# Patient Record
Sex: FEMALE | Race: WHITE | Employment: OTHER | URBAN - METROPOLITAN AREA
[De-identification: names, ages, dates, MRNs, and addresses within clinical notes are randomized per-mention and may not be internally consistent; named-entity substitution may affect disease eponyms.]

---

## 2018-07-24 ENCOUNTER — OFFICE VISIT (OUTPATIENT)
Dept: FAMILY PLANNING/WOMEN'S HEALTH CLINIC | Age: 63
End: 2018-07-24

## 2018-07-24 ENCOUNTER — HOSPITAL ENCOUNTER (OUTPATIENT)
Dept: LAB | Age: 63
Discharge: HOME | End: 2018-07-24

## 2018-07-24 ENCOUNTER — HOSPITAL ENCOUNTER (OUTPATIENT)
Dept: MAMMOGRAPHY | Age: 63
Discharge: HOME | End: 2018-07-24

## 2018-07-24 VITALS — DIASTOLIC BLOOD PRESSURE: 66 MMHG | SYSTOLIC BLOOD PRESSURE: 111 MMHG

## 2018-07-24 DIAGNOSIS — N63.10 BREAST MASS, RIGHT: ICD-10-CM

## 2018-07-24 DIAGNOSIS — Z12.31 VISIT FOR SCREENING MAMMOGRAM: ICD-10-CM

## 2018-07-24 DIAGNOSIS — Z01.419 WELL WOMAN EXAM WITH ROUTINE GYNECOLOGICAL EXAM: Primary | ICD-10-CM

## 2018-07-24 PROCEDURE — 88175 CYTOPATH C/V AUTO FLUID REDO: CPT | Performed by: PHYSICIAN ASSISTANT

## 2018-07-24 NOTE — PROGRESS NOTES
Assessment/Plan:    Diagnoses and all orders for this visit:    1. Well woman exam with routine gynecological exam  -     PAP IG, RFX APTIMA HPV ASCUS ([id number]))    2. Breast mass, right  Mass has been present since 10/17. She has never had a mammogram before. The characteristics of the mass are concerning for cancer. Get diagnostic mammogram       Follow-up Disposition: Not on File    [address] [facility], [facility]  [address] [facility]. expressed understanding of this plan. An AVS was printed and given to the patient.      ----------------------------------------------------------------------    Chief Complaint   Patient presents with    Well Woman     [facility] visit       History of Present Illness:    Menopause 13 years ago  Not sexually active  Never has had a mammogram before  Felt a right breast mass 10/17. Since then, she has not noted any change in size or characteristics. It is painful at times and she puts ice on it. She states that when she palpates it it sometimes gets bigger for a short time then reduces its size again. She does not have a hx of fibrocystic breasts that she is aware of. Last pap? Where? She is not sure of the clinic name  She had a colonoscopy 20 years ago. Her stool test was negative for blood today  She does not have regular health care- I talked to her about [facility] and [facility] as options for uninsured pts        No past medical history on file. No Known Allergies    Social History   Substance Use Topics    Smoking status: Former Smoker    Smokeless tobacco: Never Used    Alcohol use Not on file       No family history on file. Physical Exam:     Visit Vitals    /66       A&Ox3  WDWN NAD  Respirations normal and non labored  Breast exam- right breast has a 1 by 1.5-2 cm irregular mass at 6 oclock about 1 inch below nipple. The mass feels to have a sharp superior edge and then wider base.  It did not change in shape or size during the exam. There is no retraction or skin color changes or other masses in her left breast   Pelvic exam ext neg for lesion or discharge. Vagina and cervix show mild post menopausal changes. Pap done.  Uterus and adnexal exam neg for mass or tenderness  Rectal- stool neg for heme
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
## 2018-07-31 ENCOUNTER — APPOINTMENT (OUTPATIENT)
Dept: MAMMOGRAPHY | Age: 63
End: 2018-07-31

## 2018-07-31 ENCOUNTER — HOSPITAL ENCOUNTER (OUTPATIENT)
Dept: MAMMOGRAPHY | Age: 63
Discharge: HOME | End: 2018-07-31

## 2018-07-31 DIAGNOSIS — R92.8 ABNORMAL SCREENING MAMMOGRAM: ICD-10-CM

## 2018-07-31 DIAGNOSIS — N63.10 BREAST MASS, RIGHT: ICD-10-CM

## 2018-07-31 PROCEDURE — 77066 DX MAMMO INCL CAD BI: CPT

## 2018-07-31 PROCEDURE — 76642 ULTRASOUND BREAST LIMITED: CPT

## 2018-07-31 NOTE — PROGRESS NOTES
Results of mammogram were called to [facility]. I spoke with Yuki Mckeon. They said they will contact patient to set up appointment with [facility].

## 2018-08-09 ENCOUNTER — HOSPITAL ENCOUNTER (OUTPATIENT)
Dept: LAB | Age: 63
Discharge: HOME | End: 2018-08-09

## 2018-08-09 ENCOUNTER — DOCUMENTATION ONLY (OUTPATIENT)
Dept: SURGERY | Age: 63
End: 2018-08-09

## 2018-08-09 ENCOUNTER — OFFICE VISIT (OUTPATIENT)
Dept: SURGERY | Age: 63
End: 2018-08-09

## 2018-08-09 VITALS
WEIGHT: 134 LBS | DIASTOLIC BLOOD PRESSURE: 76 MMHG | SYSTOLIC BLOOD PRESSURE: 147 MMHG | HEART RATE: 76 BPM | HEIGHT: 66 IN | BODY MASS INDEX: 21.53 KG/M2

## 2018-08-09 DIAGNOSIS — R92.8 ABNORMAL ULTRASOUND OF BREAST: Primary | ICD-10-CM

## 2018-08-09 DIAGNOSIS — R92.8 ABNORMAL MAMMOGRAM: ICD-10-CM

## 2018-08-09 NOTE — MR AVS SNAPSHOT
[address] [place] [address] [address] [facility] 
[phone] Patient: Maria Victoria Nicholas MRN: [id number] Visit Information Date & Time Provider Department Dept. Phone Encounter #  
 8/9/2018  9:30 AM Debo Reeder MD [facility] [phone] [id number] Upcoming Health Maintenance Date Due Hepatitis C Screening 1955 DTaP/Tdap/Td series (1 - Tdap) 1/17/1976 FOBT Q 1 YEAR AGE 50-75 1/17/2005 ZOSTER VACCINE AGE 60> 11/17/2014 Influenza Age 5 to Adult 8/1/2018 BREAST CANCER SCRN MAMMOGRAM 7/31/2020 PAP AKA CERVICAL CYTOLOGY 7/24/2021 Allergies as of 8/9/2018  Review Complete On: 8/9/2018 By: Debo Reeder MD  
 No Known Allergies Current Immunizations  Never Reviewed No immunizations on file. Not reviewed this visit You Were Diagnosed With   
  
 Codes Comments Abnormal ultrasound of breast    -  Primary ICD-10-CM: R92.8 ICD-9-CM: 793.89 Abnormal mammogram     ICD-10-CM: R92.8 ICD-9-CM: 793.80 Vitals BP Pulse Height(growth percentile) Weight(growth percentile) BMI OB Status 147/76 76 5' 6\" (1.676 m) 134 lb (60.8 kg) 21.63 kg/m2 Postmenopausal  
 Smoking Status Former Smoker BMI and BSA Data Body Mass Index Body Surface Area  
 21.63 kg/m 2 1.68 m 2 Your Updated Medication List  
  
Notice  As of 8/9/2018  4:50 PM  
 You have not been prescribed any medications. We Performed the Following SURGICAL PATHOLOGY [PHL8167 Custom] Patient Instructions Needle Breast Biopsy: About This Test 
What is it? A breast biopsy removes a sample of breast tissue that is looked at under a microscope to check for breast cancer. For a needle breast biopsy, your doctor uses a needle to take a small sample of fluid or cells from the breast for testing. Why is this test done? A breast biopsy is usually done to check a lump found during a breast exam or a suspicious area found on a mammogram or other imaging. If there is a good chance that your doctor can get a sample without doing an open (surgical) biopsy, you can have a needle biopsy. You may have a choice of what kind of biopsy you prefer. How can you prepare for the test? 
Talk to your doctor about all your health conditions before the test. For example, tell your doctor if you: · Are taking any medicines. · Are allergic to any medicines. · Have had bleeding problems, or if you take aspirin or some other blood thinner. · Are or might be pregnant. What happens before the test? 
· You will take off your clothing above the waist. A paper or cloth gown will cover your shoulders. · You will sit or lie on an examination table. Your hands may be at your sides or raised above your head (whichever position makes it easiest to find the lump or suspicious area). · Your skin is washed with a special soap. · You may get a shot of medicine to numb the biopsy area on your breast. 
What happens during the test? 
· For a fine-needle aspiration biopsy, your doctor inserts a thin needle into the lump or suspicious area. Then he or she removes a sample of cells or fluid. · For a core needle biopsy: ¨ A small cut is made in your skin. ¨ Your doctor inserts a needle with a special tip. Then he or she removes a sample of breast tissue about the size of a grain of rice. ¨ About 3 to 12 samples are needed to get the most accurate results. After either type of biopsy, the needle is removed and pressure is put on the needle site to stop any bleeding. The area is covered with a bandage. What else should you know about the test? 
· You will feel only a quick sting from the needle if you have a local anesthetic to numb the biopsy area. You may feel some pressure when the biopsy needle is put in. · For a core needle biopsy, the small cut for the needle does not usually need stitches. How long does the test take? · A fine-needle aspiration biopsy will take about 5 to 15 minutes. · A core needle biopsy will take about 15 minutes. What happens after the test? 
· You'll be told how long it may take to get your results back. · You will probably be able to go home right away. · You can go back to your usual activities right away, but avoid heavy lifting for 24 hours. · The site may be tender for 2 to 3 days. You may also have some bruising, swelling, or slight bleeding. ¨ You can use an ice pack. Put ice or a cold pack on the area for 10 to 20 minutes at a time. Put a thin cloth between the ice and your skin. ¨ Ask your doctor if you can take an over-the-counter pain medicine, such as acetaminophen (Tylenol), ibuprofen (Advil, Motrin), or naproxen (Aleve). Be safe with medicines. Read and follow all instructions on the label. · After a specialist looks at the biopsy sample for signs of cancer, your doctor's office will let you know the results. · If the test results are not clear, you may have another biopsy or test. 
Follow-up care is a key part of your treatment and safety. Be sure to make and go to all appointments, and call your doctor if you are having problems. It's also a good idea to keep a list of the medicines you take. Ask your doctor when you can expect to have your test results. Where can you learn more? Go to http://maki-andreia.info/. Enter R775 in the search box to learn more about \"Needle Breast Biopsy: About This Test.\" Current as of: May 12, 2017 Content Version: 11.7 © 2931-7547 [employer]. Care instructions adapted under license by [employer] (which disclaims liability or warranty for this information).  If you have questions about a medical condition or this instruction, always ask your healthcare professional. [employer] disclaims any warranty or liability for your use of this information. Needle Breast Biopsy: About This Test 
What is it? A breast biopsy removes a sample of breast tissue that is looked at under a microscope to check for breast cancer. For a needle breast biopsy, your doctor uses a needle to take a small sample of fluid or cells from the breast for testing. Why is this test done? A breast biopsy is usually done to check a lump found during a breast exam or a suspicious area found on a mammogram or other imaging. If there is a good chance that your doctor can get a sample without doing an open (surgical) biopsy, you can have a needle biopsy. You may have a choice of what kind of biopsy you prefer. How can you prepare for the test? 
Talk to your doctor about all your health conditions before the test. For example, tell your doctor if you: · Are taking any medicines. · Are allergic to any medicines. · Have had bleeding problems, or if you take aspirin or some other blood thinner. · Are or might be pregnant. What happens before the test? 
· You will take off your clothing above the waist. A paper or cloth gown will cover your shoulders. · You will sit or lie on an examination table. Your hands may be at your sides or raised above your head (whichever position makes it easiest to find the lump or suspicious area). · Your skin is washed with a special soap. · You may get a shot of medicine to numb the biopsy area on your breast. 
What happens during the test? 
· For a fine-needle aspiration biopsy, your doctor inserts a thin needle into the lump or suspicious area. Then he or she removes a sample of cells or fluid. · For a core needle biopsy: ¨ A small cut is made in your skin. ¨ Your doctor inserts a needle with a special tip. Then he or she removes a sample of breast tissue about the size of a grain of rice. ¨ About 3 to 12 samples are needed to get the most accurate results. After either type of biopsy, the needle is removed and pressure is put on the needle site to stop any bleeding. The area is covered with a bandage. What else should you know about the test? 
· You will feel only a quick sting from the needle if you have a local anesthetic to numb the biopsy area. You may feel some pressure when the biopsy needle is put in. · For a core needle biopsy, the small cut for the needle does not usually need stitches. How long does the test take? · A fine-needle aspiration biopsy will take about 5 to 15 minutes. · A core needle biopsy will take about 15 minutes. What happens after the test? 
· You'll be told how long it may take to get your results back. · You will probably be able to go home right away. · You can go back to your usual activities right away, but avoid heavy lifting for 24 hours. · The site may be tender for 2 to 3 days. You may also have some bruising, swelling, or slight bleeding. ¨ You can use an ice pack. Put ice or a cold pack on the area for 10 to 20 minutes at a time. Put a thin cloth between the ice and your skin. ¨ Ask your doctor if you can take an over-the-counter pain medicine, such as acetaminophen (Tylenol), ibuprofen (Advil, Motrin), or naproxen (Aleve). Be safe with medicines. Read and follow all instructions on the label. · After a specialist looks at the biopsy sample for signs of cancer, your doctor's office will let you know the results. · If the test results are not clear, you may have another biopsy or test. 
Follow-up care is a key part of your treatment and safety. Be sure to make and go to all appointments, and call your doctor if you are having problems. It's also a good idea to keep a list of the medicines you take. Ask your doctor when you can expect to have your test results. Where can you learn more? Go to http://maki-andreia.info/.  
Enter J535 in the search box to learn more about \"Needle Breast Biopsy: About This Test.\" Current as of: May 12, 2017 Content Version: 11.7 © 7994-3643 [employer]. Care instructions adapted under license by [employer] (which disclaims liability or warranty for this information). If you have questions about a medical condition or this instruction, always ask your healthcare professional. [address]  any warranty or liability for your use of this information. Introducing [facility]! [employer] introduces Sittercity patient portal. Now you can access parts of your medical record, email your doctor's office, and request medication refills online. 1. In your internet browser, go to [email or web address]. [employer] 2. Click on the First Time User? Click Here link in the Sign In box. You will see the New Member Sign Up page. 3. Enter your Sittercity Access Code exactly as it appears below. You will not need to use this code after youve completed the sign-up process. If you do not sign up before the expiration date, you must request a new code. · Sittercity Access Code: [email or web address] Expires: 10/18/2018 11:28 AM 
 
4. Enter the last four digits of your Social Security Number (xxxx) and Date of Birth (mm/dd/yyyy) as indicated and click Submit. You will be taken to the next sign-up page. 5. Create a Sittercity ID. This will be your Sittercity login ID and cannot be changed, so think of one that is secure and easy to remember. 6. Create a Sittercity password. You can change your password at any time. 7. Enter your Password Reset Question and Answer. This can be used at a later time if you forget your password. 8. Enter your e-mail address. You will receive e-mail notification when new information is available in [address] 9. Click Sign Up. You can now view and download portions of your medical record. 10. Click the Download Summary menu link to download a portable copy of your medical information. If you have questions, please visit the Frequently Asked Questions section of the [employer] website. Remember, [employer] is NOT to be used for urgent needs. For medical emergencies, dial 911. Now available from your iPhone and Android! Please provide this summary of care documentation to your next provider. Your primary care clinician is listed as NONE. If you have any questions after today's visit, please call [phone].

## 2018-08-09 NOTE — PROGRESS NOTES
HISTORY OF PRESENT ILLNESS  Cecy Chiu is a 61 y.o. female. HPI  NEW patient consult referred by Imaging for abnormal RIGHT breast mammogram.  Patient felt a small bead-like lump to there RIGHT breast in 2017. The RIGHT breast hurts when she uses her seat belt or presses her breast.  Denies skin changes or nipple discharge/retraction. Obstetric History       T0      L0     SAB0   TAB0   Ectopic0   Molar0   Multiple0   Live Births0    Obstetric Comments   Menarche 15, LMP 48, # of children 2, age of 4st delivery 12, Hysterectomy/oophorectomy no/no, Breast bx no, history of breast feeding no, BCP yes, Hormone therapy no     FH:  Maternal aunt had breast cancer at 80, survivor. Mammogram, 18, BIRADS 4C  No previous mammograms. FINDINGS:   Bilateral digital diagnostic mammography was performed and interpreted in  conjunction with CAD over-read. At the 7:00 position of the right breast, there  are segmental pleomorphic microcalcifications measuring approximately 2.6 cm in  extent. No definite associated mass is evident. There are no suspicious findings  in the left breast.     Limited ultrasound examination of the right breast was performed by the  technologist and myself. At the 7 to 8:00 position, at the site of the palpable  lump, there are dilated ducts with intraductal filling defects. The filling  defects demonstrate internal vascularity on color Doppler imaging. No localized  solid mass is evident. IMPRESSION:  BI-RADS Assessment Category 4C: Suspicious abnormality - Biopsy should be  performed in the absence of clinical contraindication. Palpable finding in the  right breast corresponds to segmental pleomorphic microcalcifications on  mammography and abnormal ducts with intraductal filling defects on ultrasound. Findings are worrisome for ductal carcinoma in situ. Further evaluation with  core needle biopsy is recommended.  Biopsy could be performed with stereotactic  or ultrasound guidance. Findings and recommendations were discussed with the  patient. Review of Systems   HENT: Negative. Eyes: Positive for blurred vision. Respiratory: Negative. Cardiovascular: Positive for chest pain. Gastrointestinal: Negative. Genitourinary: Negative. Musculoskeletal: Positive for back pain, joint pain and myalgias. Skin: Negative. Neurological: Positive for weakness. Endo/Heme/Allergies: Negative. Psychiatric/Behavioral: Negative. Physical Exam   Constitutional: She is oriented to person, place, and time. She appears well-developed and well-nourished. Cardiovascular: Normal rate, regular rhythm and normal heart sounds. Pulmonary/Chest: Effort normal and breath sounds normal. Right breast exhibits mass (2 cm hard fixed nodule just under the skin. no skin changes) and tenderness (tender with palpation). Right breast exhibits no inverted nipple, no nipple discharge and no skin change. Left breast exhibits no inverted nipple, no mass, no nipple discharge, no skin change and no tenderness. Breasts are symmetrical.       Lymphadenopathy:     She has no cervical adenopathy. She has no axillary adenopathy. Right: No supraclavicular adenopathy present. Left: No supraclavicular adenopathy present. Neurological: She is alert and oriented to person, place, and time. Skin: Skin is warm and dry. US - Guided Core Biopsy  Indication : RIGHT Breast mass lower outer quadrant. palpable. Ultrasound Findings: RIGHT 7:00 2/3 1 cm irregular hypoechoic mass with shadowing  Prep : alcohol. Anesthesia : 1% lidocaine with epinephrine, 7cc. Device : The hand-held 10 gauge BARD needle was inserted through the lesion and captured tissue with real-time Ultrasound Confirmation. .   Core Sampling :  3 cores were obtained. Marker: clip placed   Dressing : Steristrips, gauze and tape. Instructions : Remove gauze this evening.   Remove steristrips in one week. 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 This is a single focus measuring less than one millimeter.  Recommend  performing ER receptors on subsequent excision specimen. Concordance:  ASSESSMENT and PLAN    ICD-10-CM ICD-9-CM    1. Abnormal ultrasound of breast R92.8 793.89 SURGICAL PATHOLOGY   2. Abnormal mammogram R92.8 793.80 SURGICAL PATHOLOGY     RIGHT breast mass biopsied. The mass is palpable   Mammogram shows a 2.6 cm cluster of microcalcifications. Ultrasound shows a 1 cm mass with shadowing    We discussed lumpectomy if the biopsy is postiive    If positive MRI should be performed as patient has very dense breast tissue. Biopsy is positive, although only a tiny focus of DCIS was identified.   Will order breast MRI

## 2018-08-09 NOTE — PROGRESS NOTES
[facility]  OFFICE PROCEDURE PROGRESS NOTE        Chart reviewed for the following:   IDr. Ryan, have reviewed the History, Physical and updated the Allergic reactions for [place] performed immediately prior to start of procedure:   IDr. Ryan, have performed the following reviews on [address] [place] [address] prior to the start of the procedure:            * Patient was identified by name and date of birth   * Agreement on procedure being performed was verified  * Risks and Benefits explained to the patient  * Procedure site verified and marked as necessary  * Patient was positioned for comfort  * Consent was signed and verified     Time: 10:00 am      Date of procedure: 8/9/2018    Procedure performed by:  Ryan Mccarty MD    Provider assisted by: Sin Baxter RN    Patient assisted by: Sin Baxter RN    How tolerated by patient: Patient tolerated the procedure well without complications. Denies pain post biopsy. Post Procedural Pain Scale: 0/10    Comments: Written and verbal post biopsy instructions reviewed with and given to patient with her understanding.

## 2018-08-09 NOTE — PATIENT INSTRUCTIONS
Needle Breast Biopsy: About This Test  What is it? A breast biopsy removes a sample of breast tissue that is looked at under a microscope to check for breast cancer. For a needle breast biopsy, your doctor uses a needle to take a small sample of fluid or cells from the breast for testing. Why is this test done? A breast biopsy is usually done to check a lump found during a breast exam or a suspicious area found on a mammogram or other imaging. If there is a good chance that your doctor can get a sample without doing an open (surgical) biopsy, you can have a needle biopsy. You may have a choice of what kind of biopsy you prefer. How can you prepare for the test?  Talk to your doctor about all your health conditions before the test. For example, tell your doctor if you:  · Are taking any medicines. · Are allergic to any medicines. · Have had bleeding problems, or if you take aspirin or some other blood thinner. · Are or might be pregnant. What happens before the test?  · You will take off your clothing above the waist. A paper or cloth gown will cover your shoulders. · You will sit or lie on an examination table. Your hands may be at your sides or raised above your head (whichever position makes it easiest to find the lump or suspicious area). · Your skin is washed with a special soap. · You may get a shot of medicine to numb the biopsy area on your breast.  What happens during the test?  · For a fine-needle aspiration biopsy, your doctor inserts a thin needle into the lump or suspicious area. Then he or she removes a sample of cells or fluid. · For a core needle biopsy:  ¨ A small cut is made in your skin. ¨ Your doctor inserts a needle with a special tip. Then he or she removes a sample of breast tissue about the size of a grain of rice. ¨ About 3 to 12 samples are needed to get the most accurate results.   After either type of biopsy, the needle is removed and pressure is put on the needle site to stop any bleeding. The area is covered with a bandage. What else should you know about the test?  · You will feel only a quick sting from the needle if you have a local anesthetic to numb the biopsy area. You may feel some pressure when the biopsy needle is put in. · For a core needle biopsy, the small cut for the needle does not usually need stitches. How long does the test take? · A fine-needle aspiration biopsy will take about 5 to 15 minutes. · A core needle biopsy will take about 15 minutes. What happens after the test?  · You'll be told how long it may take to get your results back. · You will probably be able to go home right away. · You can go back to your usual activities right away, but avoid heavy lifting for 24 hours. · The site may be tender for 2 to 3 days. You may also have some bruising, swelling, or slight bleeding. ¨ You can use an ice pack. Put ice or a cold pack on the area for 10 to 20 minutes at a time. Put a thin cloth between the ice and your skin. ¨ Ask your doctor if you can take an over-the-counter pain medicine, such as acetaminophen (Tylenol), ibuprofen (Advil, Motrin), or naproxen (Aleve). Be safe with medicines. Read and follow all instructions on the label. · After a specialist looks at the biopsy sample for signs of cancer, your doctor's office will let you know the results. · If the test results are not clear, you may have another biopsy or test.  Follow-up care is a key part of your treatment and safety. Be sure to make and go to all appointments, and call your doctor if you are having problems. It's also a good idea to keep a list of the medicines you take. Ask your doctor when you can expect to have your test results. Where can you learn more? Go to http://maki-andreia.info/. Enter U949 in the search box to learn more about \"Needle Breast Biopsy: About This Test.\"  Current as of:  May 12, 2017  Content Version: 11.7  © 5860-1015 Healthwise, Incorporated. Care instructions adapted under license by [employer] (which disclaims liability or warranty for this information). If you have questions about a medical condition or this instruction, always ask your healthcare professional. [address] any warranty or liability for your use of this information. Needle Breast Biopsy: About This Test  What is it? A breast biopsy removes a sample of breast tissue that is looked at under a microscope to check for breast cancer. For a needle breast biopsy, your doctor uses a needle to take a small sample of fluid or cells from the breast for testing. Why is this test done? A breast biopsy is usually done to check a lump found during a breast exam or a suspicious area found on a mammogram or other imaging. If there is a good chance that your doctor can get a sample without doing an open (surgical) biopsy, you can have a needle biopsy. You may have a choice of what kind of biopsy you prefer. How can you prepare for the test?  Talk to your doctor about all your health conditions before the test. For example, tell your doctor if you:  · Are taking any medicines. · Are allergic to any medicines. · Have had bleeding problems, or if you take aspirin or some other blood thinner. · Are or might be pregnant. What happens before the test?  · You will take off your clothing above the waist. A paper or cloth gown will cover your shoulders. · You will sit or lie on an examination table. Your hands may be at your sides or raised above your head (whichever position makes it easiest to find the lump or suspicious area). · Your skin is washed with a special soap. · You may get a shot of medicine to numb the biopsy area on your breast.  What happens during the test?  · For a fine-needle aspiration biopsy, your doctor inserts a thin needle into the lump or suspicious area. Then he or she removes a sample of cells or fluid.   · For a core needle biopsy:  ¨ A small cut is made in your skin. ¨ Your doctor inserts a needle with a special tip. Then he or she removes a sample of breast tissue about the size of a grain of rice. ¨ About 3 to 12 samples are needed to get the most accurate results. After either type of biopsy, the needle is removed and pressure is put on the needle site to stop any bleeding. The area is covered with a bandage. What else should you know about the test?  · You will feel only a quick sting from the needle if you have a local anesthetic to numb the biopsy area. You may feel some pressure when the biopsy needle is put in. · For a core needle biopsy, the small cut for the needle does not usually need stitches. How long does the test take? · A fine-needle aspiration biopsy will take about 5 to 15 minutes. · A core needle biopsy will take about 15 minutes. What happens after the test?  · You'll be told how long it may take to get your results back. · You will probably be able to go home right away. · You can go back to your usual activities right away, but avoid heavy lifting for 24 hours. · The site may be tender for 2 to 3 days. You may also have some bruising, swelling, or slight bleeding. ¨ You can use an ice pack. Put ice or a cold pack on the area for 10 to 20 minutes at a time. Put a thin cloth between the ice and your skin. ¨ Ask your doctor if you can take an over-the-counter pain medicine, such as acetaminophen (Tylenol), ibuprofen (Advil, Motrin), or naproxen (Aleve). Be safe with medicines. Read and follow all instructions on the label. · After a specialist looks at the biopsy sample for signs of cancer, your doctor's office will let you know the results. · If the test results are not clear, you may have another biopsy or test.  Follow-up care is a key part of your treatment and safety. Be sure to make and go to all appointments, and call your doctor if you are having problems.  It's also a good idea to keep a list of the medicines you take. Ask your doctor when you can expect to have your test results. Where can you learn more? Go to http://maki-andreia.info/. Enter I404 in the search box to learn more about \"Needle Breast Biopsy: About This Test.\"  Current as of: May 12, 2017  Content Version: 11.7  © 8788-4615 [employer]. Care instructions adapted under license by [employer] (which disclaims liability or warranty for this information). If you have questions about a medical condition or this instruction, always ask your healthcare professional. [place] [address] any warranty or liability for your use of this information.

## 2018-08-14 ENCOUNTER — TELEPHONE (OUTPATIENT)
Dept: SURGERY | Age: 63
End: 2018-08-14

## 2018-08-14 DIAGNOSIS — D05.11 DUCTAL CARCINOMA IN SITU (DCIS) OF RIGHT BREAST: Primary | ICD-10-CM

## 2018-08-14 NOTE — COMMUNICATION BODY
HISTORY OF PRESENT ILLNESS  Marquita Schilder is a 61 y.o. female. HPI  NEW patient consult referred by Imaging for abnormal RIGHT breast mammogram.  Patient felt a small bead-like lump to there RIGHT breast in 2017. The RIGHT breast hurts when she uses her seat belt or presses her breast.  Denies skin changes or nipple discharge/retraction. Obstetric History       T0      L0     SAB0   TAB0   Ectopic0   Molar0   Multiple0   Live Births0    Obstetric Comments   Menarche 15, LMP 48, # of children 2, age of 4st delivery 12, Hysterectomy/oophorectomy no/no, Breast bx no, history of breast feeding no, BCP yes, Hormone therapy no     FH:  Maternal aunt had breast cancer at 80, survivor. Mammogram, 18, BIRADS 4C  No previous mammograms. FINDINGS:   Bilateral digital diagnostic mammography was performed and interpreted in  conjunction with CAD over-read. At the 7:00 position of the right breast, there  are segmental pleomorphic microcalcifications measuring approximately 2.6 cm in  extent. No definite associated mass is evident. There are no suspicious findings  in the left breast.     Limited ultrasound examination of the right breast was performed by the  technologist and myself. At the 7 to 8:00 position, at the site of the palpable  lump, there are dilated ducts with intraductal filling defects. The filling  defects demonstrate internal vascularity on color Doppler imaging. No localized  solid mass is evident. IMPRESSION:  BI-RADS Assessment Category 4C: Suspicious abnormality - Biopsy should be  performed in the absence of clinical contraindication. Palpable finding in the  right breast corresponds to segmental pleomorphic microcalcifications on  mammography and abnormal ducts with intraductal filling defects on ultrasound. Findings are worrisome for ductal carcinoma in situ. Further evaluation with  core needle biopsy is recommended.  Biopsy could be performed with stereotactic  or ultrasound guidance. Findings and recommendations were discussed with the  patient. Review of Systems   HENT: Negative. Eyes: Positive for blurred vision. Respiratory: Negative. Cardiovascular: Positive for chest pain. Gastrointestinal: Negative. Genitourinary: Negative. Musculoskeletal: Positive for back pain, joint pain and myalgias. Skin: Negative. Neurological: Positive for weakness. Endo/Heme/Allergies: Negative. Psychiatric/Behavioral: Negative. Physical Exam   Constitutional: She is oriented to person, place, and time. She appears well-developed and well-nourished. Cardiovascular: Normal rate, regular rhythm and normal heart sounds. Pulmonary/Chest: Effort normal and breath sounds normal. Right breast exhibits mass (2 cm hard fixed nodule just under the skin. no skin changes) and tenderness (tender with palpation). Right breast exhibits no inverted nipple, no nipple discharge and no skin change. Left breast exhibits no inverted nipple, no mass, no nipple discharge, no skin change and no tenderness. Breasts are symmetrical.       Lymphadenopathy:     She has no cervical adenopathy. She has no axillary adenopathy. Right: No supraclavicular adenopathy present. Left: No supraclavicular adenopathy present. Neurological: She is alert and oriented to person, place, and time. Skin: Skin is warm and dry. US - Guided Core Biopsy  Indication : RIGHT Breast mass lower outer quadrant. palpable. Ultrasound Findings: RIGHT 7:00 2/3 1 cm irregular hypoechoic mass with shadowing  Prep : alcohol. Anesthesia : 1% lidocaine with epinephrine, 7cc. Device : The hand-held 10 gauge BARD needle was inserted through the lesion and captured tissue with real-time Ultrasound Confirmation. .   Core Sampling :  3 cores were obtained. Marker: clip placed   Dressing : Steristrips, gauze and tape. Instructions : Remove gauze this evening.   Remove steristrips in one week. 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 This is a single focus measuring less than one millimeter.  Recommend  performing ER receptors on subsequent excision specimen. Concordance:  ASSESSMENT and PLAN    ICD-10-CM ICD-9-CM    1. Abnormal ultrasound of breast R92.8 793.89 SURGICAL PATHOLOGY   2. Abnormal mammogram R92.8 793.80 SURGICAL PATHOLOGY     RIGHT breast mass biopsied. The mass is palpable   Mammogram shows a 2.6 cm cluster of microcalcifications. Ultrasound shows a 1 cm mass with shadowing    We discussed lumpectomy if the biopsy is postiive    If positive MRI should be performed as patient has very dense breast tissue. Biopsy is positive, although only a tiny focus of DCIS was identified.   Will order breast MRI

## 2018-08-15 ENCOUNTER — TELEPHONE (OUTPATIENT)
Dept: FAMILY PLANNING/WOMEN'S HEALTH CLINIC | Age: 63
End: 2018-08-15

## 2018-08-30 ENCOUNTER — HOSPITAL ENCOUNTER (OUTPATIENT)
Dept: MRI IMAGING | Age: 63
Discharge: HOME | End: 2018-08-30
Attending: SURGERY
Payer: MEDICAID

## 2018-08-30 ENCOUNTER — DOCUMENTATION ONLY (OUTPATIENT)
Dept: FAMILY PLANNING/WOMEN'S HEALTH CLINIC | Age: 63
End: 2018-08-30

## 2018-08-30 ENCOUNTER — TELEPHONE (OUTPATIENT)
Dept: SURGERY | Age: 63
End: 2018-08-30

## 2018-08-30 DIAGNOSIS — D05.11 DUCTAL CARCINOMA IN SITU (DCIS) OF RIGHT BREAST: Primary | ICD-10-CM

## 2018-08-30 DIAGNOSIS — D05.11 DUCTAL CARCINOMA IN SITU (DCIS) OF RIGHT BREAST: ICD-10-CM

## 2018-08-30 PROCEDURE — 77059 MRI BREAST BI W WO CONT: CPT

## 2018-08-30 PROCEDURE — A9585 GADOBUTROL INJECTION: HCPCS | Performed by: SURGERY

## 2018-08-30 PROCEDURE — 74011250636 HC RX REV CODE- 250/636: Performed by: SURGERY

## 2018-08-30 RX ADMIN — GADOBUTROL 10 ML: 604.72 INJECTION INTRAVENOUS at 11:03

## 2018-08-30 NOTE — TELEPHONE ENCOUNTER
IMPRESSION:   1. 5.8 x 2.9 x 5.0 cm non mass enhancement in the lower outer quadrant of the  right breast suggest more extensive disease than the mammogram or ultrasound. Biopsy clip is at its anterior, lateral margin. 2. No lymphadenopathy. 3. No MRI evidence of malignancy in the left breast  Recommendation: If patient desires lumpectomy, consider MRI guided biopsy of the  anterior and posterior margins of the non mass enhancement to determine accurate  extent of disease. Spoke with patient  I explained she will need an MRI guided biopsy for marking clips if she would like to have a lumpectomy. Will schedule MRI guided biopsy. Pt anticipates having a lumpectomy at the end of September when she is in between jobs. She is a nanny.

## 2018-09-12 ENCOUNTER — DOCUMENTATION ONLY (OUTPATIENT)
Dept: SURGERY | Age: 63
End: 2018-09-12

## 2018-09-12 NOTE — PROGRESS NOTES
Received a call from Patience Erickson from [facility] imaging and she informed me that this patient's MRI guided biopsy for tomorrow will need to be postponed until next Friday due to machine issues. The patient was notified and offered the new date but could not accept it due to work obligations. The patient would like to have it done in October and  made aware. She was in agreement that the patient could wait until October. Patience Erickson was notified and a message left on her answering machine.

## 2018-10-02 ENCOUNTER — DOCUMENTATION ONLY (OUTPATIENT)
Dept: SURGERY | Age: 63
End: 2018-10-02

## 2018-10-08 ENCOUNTER — HOSPITAL ENCOUNTER (OUTPATIENT)
Dept: MRI IMAGING | Age: 63
Discharge: HOME | End: 2018-10-08
Attending: SURGERY
Payer: MEDICAID

## 2018-10-08 ENCOUNTER — HOSPITAL ENCOUNTER (OUTPATIENT)
Dept: MAMMOGRAPHY | Age: 63
Discharge: HOME | End: 2018-10-08
Attending: SURGERY
Payer: MEDICAID

## 2018-10-08 DIAGNOSIS — D05.11 NEOPLASM OF RIGHT BREAST, PRIMARY TUMOR STAGING CATEGORY TIS: DUCTAL CARCINOMA IN SITU (DCIS): Primary | ICD-10-CM

## 2018-10-08 DIAGNOSIS — Z98.890 STATUS POST BREAST BIOPSY: ICD-10-CM

## 2018-10-08 DIAGNOSIS — D05.11 DUCTAL CARCINOMA IN SITU (DCIS) OF RIGHT BREAST: ICD-10-CM

## 2018-10-08 PROCEDURE — 74011250636 HC RX REV CODE- 250/636: Performed by: RADIOLOGY

## 2018-10-08 PROCEDURE — 74011250636 HC RX REV CODE- 250/636: Performed by: SURGERY

## 2018-10-08 PROCEDURE — 88305 TISSUE EXAM BY PATHOLOGIST: CPT | Performed by: SURGERY

## 2018-10-08 PROCEDURE — 88342 IMHCHEM/IMCYTCHM 1ST ANTB: CPT | Performed by: SURGERY

## 2018-10-08 PROCEDURE — A9585 GADOBUTROL INJECTION: HCPCS | Performed by: SURGERY

## 2018-10-08 PROCEDURE — 77012000007 MRI BX BREAST VAC RT 1ST LESION W/CLIP AND SPECIMEN

## 2018-10-08 PROCEDURE — 74011000250 HC RX REV CODE- 250: Performed by: RADIOLOGY

## 2018-10-08 PROCEDURE — 88360 TUMOR IMMUNOHISTOCHEM/MANUAL: CPT | Performed by: SURGERY

## 2018-10-08 PROCEDURE — 77065 DX MAMMO INCL CAD UNI: CPT

## 2018-10-08 PROCEDURE — 88341 IMHCHEM/IMCYTCHM EA ADD ANTB: CPT | Performed by: SURGERY

## 2018-10-08 RX ORDER — LIDOCAINE HYDROCHLORIDE AND EPINEPHRINE 10; 10 MG/ML; UG/ML
25 INJECTION, SOLUTION INFILTRATION; PERINEURAL ONCE
Status: DISPENSED | OUTPATIENT
Start: 2018-10-08 | End: 2018-10-09

## 2018-10-08 RX ORDER — LIDOCAINE HYDROCHLORIDE 10 MG/ML
8 INJECTION INFILTRATION; PERINEURAL ONCE
Status: DISPENSED | OUTPATIENT
Start: 2018-10-08 | End: 2018-10-09

## 2018-10-08 RX ADMIN — GADOBUTROL 7 ML: 604.72 INJECTION INTRAVENOUS at 13:55

## 2018-10-08 NOTE — PROGRESS NOTES
1250- IV SL established without problem. Pt tolerated well. Resting on stretcher. Multiple questions answered. Now Dr. Trisha Zhong speaking with pt and answering questions. Pt thoroughly reviewed consent before signing. Dr. Trisha Zhong informed pt that we would likely only do one biopsy site as that would be all that is necessary to give the appropriate information. 1305- Pt amb to MRI room for positioning and pre-procedural scanning. 1328- Time out done. Pt procedure confirmed. Pt questions answered. Dr. Trisha Zhong begins invasive portion of right breast MRI guided biopsy. 1350- Biopsy complete. Pt tolerated procedure well. Specimens obtained and labeled accordingly. Pressure held to biopsy site immediately. 1400- Pt moved to stretcher in recovery area. No active bleeding noted. Resting comfortably. IV D/C'd intact without problem. Pt given written copy of discharge to review while awaiting clip films. All questions answered. 1410- Pt to mammo area for clip films. 1420- Pt returns. Bx site dressed with steri-strip, telfa, and hypafix. 6\" ace wrap snugly to chest with ice pack over bx site. D/C instructions reviewed with pt and copy given. All questions answered. Ensured pt has my contact information. Verbalized her understanding. D/C'd joce, stable, NAD. Specimens prepped for  p/u.

## 2018-10-11 ENCOUNTER — TELEPHONE (OUTPATIENT)
Dept: SURGERY | Age: 63
End: 2018-10-11

## 2018-10-11 NOTE — TELEPHONE ENCOUNTER
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

## 2018-10-12 PROBLEM — C50.911 BREAST CANCER, RIGHT (HCC): Status: ACTIVE | Noted: 2018-10-12

## 2018-10-16 ENCOUNTER — OFFICE VISIT (OUTPATIENT)
Dept: SURGERY | Age: 63
End: 2018-10-16

## 2018-10-16 VITALS
HEART RATE: 73 BPM | HEIGHT: 66 IN | BODY MASS INDEX: 21.53 KG/M2 | WEIGHT: 134 LBS | DIASTOLIC BLOOD PRESSURE: 79 MMHG | SYSTOLIC BLOOD PRESSURE: 134 MMHG

## 2018-10-16 DIAGNOSIS — C50.511 MALIGNANT NEOPLASM OF LOWER-OUTER QUADRANT OF RIGHT BREAST OF FEMALE, ESTROGEN RECEPTOR NEGATIVE (HCC): Primary | ICD-10-CM

## 2018-10-16 DIAGNOSIS — Z17.1 MALIGNANT NEOPLASM OF LOWER-OUTER QUADRANT OF RIGHT BREAST OF FEMALE, ESTROGEN RECEPTOR NEGATIVE (HCC): Primary | ICD-10-CM

## 2018-10-16 NOTE — PROGRESS NOTES
HISTORY OF PRESENT ILLNESS  Brandi Shetty is a 61 y.o. female. HPI ESTABLISHED patient here for breast talk to discuss plan of care for RIGHT breast cancer. She had an MRI guided biopsy, RIGHT breast 10/8/18 and has some pain with palpation or excercise 4/10 pain, minimal bruising. 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 She appears well-developed and well-nourished. Cardiovascular: Normal rate, regular rhythm and normal heart sounds. Pulmonary/Chest: Effort normal and breath sounds normal. Right breast exhibits skin change (biopsy scars LOQ and ecchymosis medial breast). Right breast exhibits no inverted nipple, no mass, no nipple discharge and no tenderness. Left breast exhibits no inverted nipple, no mass, no nipple discharge, no skin change and no tenderness. Breasts are symmetrical.       Lymphadenopathy:     She has no cervical adenopathy. She has no axillary adenopathy. Right: No supraclavicular adenopathy present. Left: No supraclavicular adenopathy present. Neurological: She is alert and oriented to person, place, and time. Skin: Skin is warm and dry. History reviewed. No pertinent past medical history.   Past Surgical History:   Procedure Laterality Date    HX ORTHOPAEDIC      BROKEN JAW/NOSE      Family History   Problem Relation Age of Onset    Heart Disease Father     Heart Disease Sister     Heart Disease Brother     Breast Cancer Maternal Aunt 80     SURVIVOR     Social History   Substance Use Topics    Smoking status: Former Smoker     Quit date: 1978    Smokeless tobacco: Never Used    Alcohol use No      Prior to Admission medications    Not on File      Allergies   Allergen Reactions    Latex Rash     Localized redness and rash       ASSESSMENT and PLAN    ICD-10-CM ICD-9-CM    1. Malignant neoplasm of lower-outer quadrant of right breast of female, estrogen receptor negative ([facility] C50.511 174.5     Z17.1 V86.1        45 minutes were spent face-to-face with the patient during this encounter and 90% of that time was spent on counseling and coordination of care. 1. We reviewed her mammogram films. She has a streak of microcalcifications and a clip at either end.  2. We reviewed her MRI with Dr Rocio Meyers  3. We discussed the surgical procedure. Needle localized lumpectomy with 2 guide wires, and sentinel node biopsy with blue dye. 4. She has DCIS with some microinvasion and is ER/[place] negative and Her2 positive.   If invasive disease is greater than 6mm she may benefit from chemo/Herceptin    PLAN:  RIGHT lumpectomy, needle localized and sentinel node biopsy

## 2018-10-16 NOTE — PATIENT INSTRUCTIONS
Learning About Breast Cancer Surgery  What is breast cancer surgery? Surgery is a key part of treatment for breast cancer. The type of surgery you have depends on the size, location, and type of the cancer. It also depends on your health and what is important to you. Your doctor may combine treatments. This is a common way to treat breast cancer. You may have surgery to remove all the cancer that can be seen. After surgery you may also need radiation, chemotherapy, or hormone therapy. These treatments get rid of any cancer cells that may be left. During the surgery, the doctor may remove lymph nodes from the armpit. The lymph nodes will be looked at under a microscope. This is used to check if cancer has spread from the breast into the lymph nodes. What is a lumpectomy? Lumpectomy    Lumpectomy removes the tumor in the breast. The incision is made close to the tumor. This shows common places where the cut is made. Simple mastectomy    Simple mastectomy removes the whole breast, including nipple and skin. This shows where the cut is often made. Nipple-sparing mastectomy with inframammary cut    Nipple-sparing mastectomy removes the whole breast but leaves the skin and nipple. This shows the cut in the curve under the breast (inframammary). Nipple-sparing mastectomy with lateral cut    Nipple-sparing mastectomy removes the whole breast but leaves the skin and nipple. This shows the cut from the nipple along the side of the breast toward the armpit (lateral). Nipple-sparing mastectomy with periareolar cut    Nipple-sparing mastectomy removes the whole breast but leaves the skin and nipple. This shows the cut around the top of the nipple and along the side of the breast toward the armpit (periareolar).   Skin-sparing mastectomy with periareolar cut    Skin-sparing mastectomy removes the whole breast and the nipple, but it keeps the skin that covers the breast. This shows the cut around the nipple (periareloar). Skin-sparing mastectomy with teardrop cut    Skin-sparing mastectomy removes the whole breast and the nipple, but it keeps the skin that covers the breast. This shows the cut around the nipple in a teardrop shape. Skin-sparing mastectomy with tennis racket cut    Skin-sparing mastectomy removes the whole breast and the nipple, but it keeps the skin that covers the breast. This shows the cut around the nipple and along the side of the breast toward the armpit (tennis racket shape). What can you expect after surgery? Your doctor will send the breast tissue to a lab for testing. This will help the doctor know more about the type of cancer you have. It may take up to a week or more to get the results back. Your doctor will discuss the results with you. You may meet with a doctor who specializes in cancer treatment (an oncologist). This doctor can help you decide about any other treatment you may need. Your needs and values are important when choosing a treatment that is right for you. The amount of time you will need to recover depends on the type of surgery you had. It also depends on whether you need any more treatment. If you had a lumpectomy, you will probably look the same in a bra. But your breasts may not match in size or shape after surgery. This depends on the size of your breasts and how much tissue was removed. Surgery to create a new breast is called reconstruction. This may be done at the same time as a mastectomy. Or it may be done later. Some women choose not to do reconstruction at all. You choose what feels right for you. No matter what kind of surgery you have, you will get information about your treatment. This includes how to prepare, what to expect, and what to do afterward. Follow-up care is a key part of your treatment and safety. Be sure to make and go to all appointments, and call your doctor if you are having problems.  It's also a good idea to know your test results and keep a list of the medicines you take. Where can you learn more? Go to http://maki-andreia.info/. Enter P020 in the search box to learn more about \"Learning About Breast Cancer Surgery. \"  Current as of: September 20, 2017  Content Version: 11.8  © 3142-5886 Healthwise, [employer]. Care instructions adapted under license by [employer] (which disclaims liability or warranty for this information). If you have questions about a medical condition or this instruction, always ask your healthcare professional. [address] any warranty or liability for your use of this information.

## 2018-10-19 DIAGNOSIS — C50.511 MALIGNANT NEOPLASM OF LOWER-OUTER QUADRANT OF RIGHT FEMALE BREAST, UNSPECIFIED ESTROGEN RECEPTOR STATUS (HCC): Primary | ICD-10-CM

## 2018-10-19 NOTE — TELEPHONE ENCOUNTER
PER PATIENT , SURGERY AT [facility] ON 10-29-18  AM  PATIENT TO ARRIVE AT 7 AM BEHIND THE INFORMATION DESK  ON 1ST FLOOR.  NPO AFTER MIDNIGHT THE NIGHT BEFORE. NOTHING ON  THE SKIN. NO LOTION POWDER, PERFUME, DEODORANT  OR MAKEUP. WILL NEED A . PRE OP TESTING 10-26-18 ARRIVING AT 12:30 PM  2ND FLOOR [facility]. PATIENT HAS BEEN NOTIFIED AND INSTRUCTIONS GIVEN.

## 2018-10-25 ENCOUNTER — TELEPHONE (OUTPATIENT)
Dept: SURGERY | Age: 63
End: 2018-10-25
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
## 2018-10-26 ENCOUNTER — HOSPITAL ENCOUNTER (OUTPATIENT)
Dept: PREADMISSION TESTING | Age: 63
Discharge: HOME | End: 2018-10-26
Payer: MEDICAID

## 2018-10-26 VITALS
HEIGHT: 66 IN | HEART RATE: 75 BPM | TEMPERATURE: 97.2 F | BODY MASS INDEX: 22.32 KG/M2 | OXYGEN SATURATION: 99 % | SYSTOLIC BLOOD PRESSURE: 112 MMHG | WEIGHT: 138.89 LBS | RESPIRATION RATE: 18 BRPM | DIASTOLIC BLOOD PRESSURE: 64 MMHG

## 2018-10-26 LAB
ATRIAL RATE: 66 BPM
CALCULATED P AXIS, ECG09: 18 DEGREES
CALCULATED R AXIS, ECG10: 72 DEGREES
CALCULATED T AXIS, ECG11: 75 DEGREES
DIAGNOSIS, 93000: NORMAL
P-R INTERVAL, ECG05: 140 MS
Q-T INTERVAL, ECG07: 418 MS
QRS DURATION, ECG06: 82 MS
QTC CALCULATION (BEZET), ECG08: 438 MS
VENTRICULAR RATE, ECG03: 66 BPM

## 2018-10-26 PROCEDURE — 93005 ELECTROCARDIOGRAM TRACING: CPT

## 2018-10-26 NOTE — PERIOP NOTES
Pt requested to speak with Dr. Cliff Ling about the consent prior to surgery. Pt wanted to know about infection and all the risks from this procedure. Pt insisted on having an EKG due to her strong family history of Cardiovascular disease. DOS: 10/29/18.

## 2018-10-26 NOTE — PERIOP NOTES
INSTRUCTIONS [address] [address] [address] MAIN OR [phone] MAIN PRE OP [phone] AMBULATORY PRE OP [phone] PRE-ADMISSION TESTING 21  Surgery Date:   Monday, 10/29/18. Is surgery arrival time given by surgeon? YES, follow instructions on day of surgery from Dr. Aislinn Delgado office. Answers to Common Questions When You 
Arrive Arrive at the [facility] [address] [facility] on the day of your surgery Have your insurance card, photo ID, and any copayment (if needed) Food 
 and  
Drink NO food or drink after midnight the night before surgery This means NO water, gum, mints, coffee, juice, etc. 
No alcohol (beer, wine, liquor) 24 hours before and after surgery Medicine to TAKE Morning of Surgery MEDICATIONS TO TAKE THE MORNING OF SURGERY WITH A SIP OF WATER:  
? None Medicine To 
STOP  
FOR PAIN 
? You can take Tylenol  follow instructions on the bottle 
? NO Aspirin for pain  STOP today until after surgery ? NO Non-Steroidal Anti-Inflammatory Drugs (NSAIDs:  
for example, Ibuprofen (Advil, Motrin), Naproxen (Aleve) STOP today until after surgery 
? STOP herbal supplements and vitamins 1 week before surgery STOP today until after surgery Blood Thinners ? If you take Aspirin, Plavix, Coumadin, blood-thinning or anti-clot medicine, talk to your surgeon and/or follow the instructions from the doctor who told you to take that medicine Clothing Jewelry Valuables Bathing CLOTHING 
? Wear loose, comfortable clothes ? Wear glasses instead of contacts ? Leave money, jewelry and valuables at home ? No make-up, particularly mascara, the day of surgery ? REMOVE ALL piercings, rings, and jewelry - leave at home ? Wear hair loose or down; no pony-tails, buns, or metal hair clips BATHING 
?  Follow all special bath instructions (for total joint replacement, spine and bowel surgeries.) ? If you shower the morning of surgery, please do not apply any lotions, powders, or deodorants afterwards. Do not shave or trim anywhere 24 hours before surgery. Going Home 
or Spending the Night  
? SAME-DAY SURGERY: You must have a responsible adult drive you home and stay with you 24 hours after surgery ? ADMITS: If your doctor is keeping you into the hospital after surgery, leave personal belongings/luggage in your car until you have a hospital room number. Hospital discharge time is 12 noon Drivers must be here before 12 noon unless you are told differently Follow all instructions so your surgery wont be cancelled. Please, be on time. If a situation occurs and you are delayed the day of surgery, call [phone] or [phone]. If your physical condition changes (like a fever, cold, flu, etc.) call your surgeon as soon as possible. The Preadmission Testing staff can be reached at [phone]. OTHER SPECIAL INSTRUCTIONS:  Free  parking 7:00 am to 5:00 pm 
The patient was contacted  in person. She  verbalize  understanding of all instructions and does not  need reinforcement.

## 2018-10-30 ENCOUNTER — TELEPHONE (OUTPATIENT)
Dept: SURGERY | Age: 63
End: 2018-10-30

## 2018-10-30 DIAGNOSIS — C50.511 MALIGNANT NEOPLASM OF LOWER-OUTER QUADRANT OF RIGHT BREAST OF FEMALE, ESTROGEN RECEPTOR POSITIVE (HCC): Primary | ICD-10-CM

## 2018-10-30 DIAGNOSIS — Z17.0 MALIGNANT NEOPLASM OF LOWER-OUTER QUADRANT OF RIGHT BREAST OF FEMALE, ESTROGEN RECEPTOR POSITIVE (HCC): Primary | ICD-10-CM

## 2018-10-30 NOTE — TELEPHONE ENCOUNTER
Pt cancelled her surgery  She wants to have physical therapy before surgery  Referred to Yoni Stewart

## 2018-11-06 ENCOUNTER — DOCUMENTATION ONLY (OUTPATIENT)
Dept: SURGERY | Age: 63
End: 2018-11-06

## 2018-11-14 ENCOUNTER — HOSPITAL ENCOUNTER (OUTPATIENT)
Dept: PHYSICAL THERAPY | Age: 63
Discharge: HOME | End: 2018-11-14
Payer: MEDICAID

## 2018-11-14 PROCEDURE — 97162 PT EVAL MOD COMPLEX 30 MIN: CPT | Performed by: PHYSICAL THERAPIST

## 2018-11-14 PROCEDURE — 97530 THERAPEUTIC ACTIVITIES: CPT | Performed by: PHYSICAL THERAPIST

## 2018-11-14 PROCEDURE — 97110 THERAPEUTIC EXERCISES: CPT | Performed by: PHYSICAL THERAPIST
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
## 2018-11-20 ENCOUNTER — HOSPITAL ENCOUNTER (OUTPATIENT)
Dept: PHYSICAL THERAPY | Age: 63
Discharge: HOME | End: 2018-11-20
Payer: MEDICAID

## 2018-11-20 PROCEDURE — 97140 MANUAL THERAPY 1/> REGIONS: CPT

## 2018-11-20 PROCEDURE — 97110 THERAPEUTIC EXERCISES: CPT

## 2018-11-20 NOTE — PROGRESS NOTES
PT ONCOLOGY DAILY NOTE Patient Name: Neri Vinson Date:2018 : 1955 [x]  Patient  Verified Payor: [facility] MEDICAID / Plan: [facility] PLAN [facility] / Product Type: Managed Care Medicaid / In time:5:30p  Out time:6:30p Total Treatment Time (min): 60 Total Timed Codes (min): 60 
1:1 Treatment Time ( [address] only): 60 Visit #: 2 of 24 Treatment Area: Pain in right shoulder [M25.511] Pain in left shoulder [M25.512] Cervicalgia [M54.2] SUBJECTIVE Pain Level (0-10 scale): 3 constant, 8-9 sharp occassional 
Any medication changes, allergies to medications, adverse drug reactions, diagnosis change, or new procedure performed?: [x] No    [] Yes (see summary sheet for update) Subjective functional status/changes:   [] No changes reported Patient reports compliance with HEP OBJECTIVE 45 min Therapeutic Exercise:  [x] See flow sheet :  
Rationale: increase ROM and increase strength to improve the patients ability to lift, reach, carry, and complete ADL's 
 
 
15 min Manual Therapy: PROM flexion to tolerance with MWM, posterior and inferior GH jt mobs grade 2, passive stretching pecs, UT, MFR UT Rationale: increase ROM and increase tissue extensibility, decrease pain and improve ROM  to improve the patients ability to lift, reach, carry, and complete ADL's With 
 [] TE 
 [] TA 
 [] neuro 
 [] other: Patient Education: [x] Review HEP [] Progressed/Changed HEP based on:  
[] positioning   [] body mechanics   [] transfers   [] heat/ice application   
[] other:   
 
Other Objective/Functional Measures:   
 
Pain Level (0-10 scale) post treatment: 2 
 
ASSESSMENT/Changes in Function: Patient did well with exercises focused on increased tissue extensibility and will do well with further progression of exercises at next visit. Patient required verbal cues for proper exercise reproduction.   
 
Patient will benefit from skilled PT services to modify and progress therapeutic interventions, address functional mobility deficits, address ROM deficits, address strength deficits, analyze and address soft tissue restrictions, analyze and cue movement patterns, analyze and modify body mechanics/ergonomics and assess and modify postural abnormalities to address rehab goals per plan of care PLAN [x]  Upgrade activities as tolerated     [x]  Continue plan of care [x]  Update interventions per flow sheet      
[]  Discharge due to:_ 
[]  Other:_ Rogers Serrano 11/20/2018  5:49 PM

## 2018-11-26 ENCOUNTER — HOSPITAL ENCOUNTER (OUTPATIENT)
Dept: PHYSICAL THERAPY | Age: 63
Discharge: HOME | End: 2018-11-26
Payer: MEDICAID

## 2018-11-26 PROCEDURE — 97140 MANUAL THERAPY 1/> REGIONS: CPT

## 2018-11-26 PROCEDURE — 97110 THERAPEUTIC EXERCISES: CPT

## 2018-11-26 NOTE — PROGRESS NOTES
PT ONCOLOGY DAILY NOTE Patient Name: Juju Pineda Date:2018 : 1955 [x]  Patient  Verified Payor: [facility] MEDICAID / Plan: [employer] / Product Type: Managed Care Medicaid / In time:2:10p  Out time:3:10p Total Treatment Time (min): 60 Total Timed Codes (min): 60 
1:1 Treatment Time ([address] only): 60 Visit #: 3 of 24 Treatment Area: No admission diagnoses are documented for this encounter. SUBJECTIVE Pain Level (0-10 scale): 3 constant, 8-9 sharp occassional 
Any medication changes, allergies to medications, adverse drug reactions, diagnosis change, or new procedure performed?: [x] No    [] Yes (see summary sheet for update) Subjective functional status/changes:   [] No changes reported Patient reports no changes with shoulder. Patient states she is have more distinct pain along right ribs and shoulder blade that started Friday night. OBJECTIVE 45 min Therapeutic Exercise:  [x] See flow sheet :  
Rationale: increase ROM and increase strength to improve the patients ability to lift, reach, carry, and complete ADL's 
 
 
15 min Manual Therapy: PROM flexion to tolerance with MWM, posterior and inferior GH jt mobs grade 2, passive stretching pecs, UT, MFR UT, pecs scapular gliding all directions Rationale: increase ROM and increase tissue extensibility, decrease pain and improve ROM  to improve the patients ability to lift, reach, carry, and complete ADL's With 
 [] TE 
 [] TA 
 [] neuro 
 [] other: Patient Education: [x] Review HEP [] Progressed/Changed HEP based on:  
[] positioning   [] body mechanics   [] transfers   [] heat/ice application   
[] other:   
 
Other Objective/Functional Measures:   
 
Pain Level (0-10 scale) post treatment: 1 \"much better\" ASSESSMENT/Changes in Function: Patient required mod verbal cues for postural awareness with all exercises to prevent increased shoulder pain. Patient able to advance several exercises with mod fatigue noted. Patient will benefit from skilled PT services to modify and progress therapeutic interventions, address functional mobility deficits, address ROM deficits, address strength deficits, analyze and address soft tissue restrictions, analyze and cue movement patterns, analyze and modify body mechanics/ergonomics and assess and modify postural abnormalities to address rehab goals per plan of care PLAN [x]  Upgrade activities as tolerated     [x]  Continue plan of care [x]  Update interventions per flow sheet      
[]  Discharge due to:_ 
[]  Other:_ Emeli Quiñones 11/26/2018  5:49 PM

## 2018-11-27 ENCOUNTER — APPOINTMENT (OUTPATIENT)
Dept: PHYSICAL THERAPY | Age: 63
End: 2018-11-27
Payer: MEDICAID

## 2018-12-03 ENCOUNTER — HOSPITAL ENCOUNTER (OUTPATIENT)
Dept: PHYSICAL THERAPY | Age: 63
Discharge: HOME | End: 2018-12-03
Payer: MEDICAID

## 2018-12-03 PROCEDURE — 97110 THERAPEUTIC EXERCISES: CPT

## 2018-12-03 PROCEDURE — 97140 MANUAL THERAPY 1/> REGIONS: CPT

## 2018-12-03 NOTE — PROGRESS NOTES
PT ONCOLOGY DAILY NOTE Patient Name: Catalino Bai Date:12/3/2018 : 1955 [x]  Patient  Verified Payor: [facility] MEDICAID / Plan: [employer] / Product Type: Managed Care Medicaid / In time:5:00p  Out time:6:00p Total Treatment Time (min): 60 Total Timed Codes (min): 60 
1:1 Treatment Time ( [address] only): 60 Visit #: 4 of 24 Treatment Area: Pain in right shoulder [M25.511] Pain in left shoulder [M25.512] Cervicalgia [M54.2] SUBJECTIVE Pain Level (0-10 scale): 3 constant Any medication changes, allergies to medications, adverse drug reactions, diagnosis change, or new procedure performed?: [x] No    [] Yes (see summary sheet for update) Subjective functional status/changes:   [] No changes reported Patient reports she has not had the sharp pain since last visit, but does have more muscle soreness. Patient continues to have increased pain with lifting overhead. Patient reports her UT are very tight today. OBJECTIVE 45 min Therapeutic Exercise:  [x] See flow sheet :  
Rationale: increase ROM and increase strength to improve the patients ability to lift, reach, carry, and complete ADL's 
 
 
15 min Manual Therapy: STM B UT,  levator, R scalenes, Passive UT stretch, sibson's technique R Rationale: increase ROM and increase tissue extensibility, decrease pain and improve ROM  to improve the patients ability to lift, reach, carry, and complete ADL's With 
 [] TE 
 [] TA 
 [] neuro 
 [] other: Patient Education: [x] Review HEP [] Progressed/Changed HEP based on:  
[] positioning   [] body mechanics   [] transfers   [] heat/ice application   
[] other:   
 
Other Objective/Functional Measures:   
 
Pain Level (0-10 scale) post treatment: 1 \"much better\" ASSESSMENT/Changes in Function: Patient required mod verbal cues for postural awareness with all exercises to prevent increased shoulder pain. Patient able to advance several exercises with mod fatigue noted. Patient will benefit from skilled PT services to modify and progress therapeutic interventions, address functional mobility deficits, address ROM deficits, address strength deficits, analyze and address soft tissue restrictions, analyze and cue movement patterns, analyze and modify body mechanics/ergonomics and assess and modify postural abnormalities to address rehab goals per plan of care PLAN [x]  Upgrade activities as tolerated     [x]  Continue plan of care [x]  Update interventions per flow sheet      
[]  Discharge due to:_ 
[]  Other:_ Sabina Cortes 12/3/2018  5:49 PM

## 2018-12-04 ENCOUNTER — APPOINTMENT (OUTPATIENT)
Dept: PHYSICAL THERAPY | Age: 63
End: 2018-12-04
Payer: MEDICAID

## 2018-12-05 ENCOUNTER — DOCUMENTATION ONLY (OUTPATIENT)
Dept: FAMILY PLANNING/WOMEN'S HEALTH CLINIC | Age: 63
End: 2018-12-05

## 2018-12-05 NOTE — PROGRESS NOTES
Called patient to see why she has not gotten treatment. Patient stated there were 2 reasons she is putting it off     1) she has something wrong with her arm and neck and cannot handle both at the same time. 2) she had 2 MRIs - both caused a lot of swelling in the breast.  Pt feels doctor (Dr. Shaylee Frost)  is not concerned about the swelling with her MRIs. She really wants to talk to MD about this and express her concerns. Wants to know exactly what they did that are causing problems and move forward from there. I asked what stage cancer she said and her reply was \"stage 0 cancer now stage 1\". Asked patient if I could reach out to DR MELCHOR HANNAH NAY [facility] to have them call and set up another breast talk. Patient said no that she would but she would not be able to until Friday. Also after further discussion she said b/c she felt like Dr. Shaylee Frost did not fully explain why her breast were swelling after her MRI (which last one she said took 8 weeks to go down) that she does not trust Dr. Shaylee Frost and is wanting to get a second opinion. I told her that is her prerogative.   I told her that

## 2018-12-05 NOTE — PROGRESS NOTES
CC timed out so copied note I had not completed into this complete note:    Called patient to see why she has not gotten treatment. Patient stated there were 2 reasons she is putting it off      1) she has something wrong with her arm and neck and cannot handle both at the same time. She is getting PT now.       2) she had 2 MRIs - both caused a lot of swelling in the breast.  Pt feels doctor (Dr. Mimi Bland)  is not concerned about the swelling with her MRIs. She really wants to talk to MD about this again and express her concerns. Wants to know exactly what happened with the MRI that could be causing problems (meaning the swelling) and move forward from there.        I asked what stage cancer she had and her reply was \"stage 0 cancer now stage 1\".    Asked patient if I could reach out to DR [facility] to have them call and set up another breast talk. Patient said no that she would but she would not be able to until Friday. Also after further discussion, she said b/c she felt like Dr. Mimi Bland did not fully explain why her breast were swelling after her MRI (which last one she said took 8 weeks to go down) that she does not trust Dr. Mimi Bland and is wanting to get a second opinion. I told her that is her prerogative. I told her that there are other MDs at [facility] if she calls she could ask for an appt with Dr. Macario Crowe or Dr. Viktor Olivia. She can go outside of [employer] but she would have to let us know where. We left it that she would be calling [facility] within a week and I would follow up with her in 1 week to make sure this happened and see the next steps. I also told her the only reason she has SAMIR is b/c of her breast cancer and getting treatment for that is the priority right now. That too much time has passed between diagnosis and treatment.   I also let her know she should have her SAMIR for at least a year and can resume PT or other needed visits such as cardiac issues, etc after her breast cancer surgery as she would have insurance. Also mentioned at 1 year elizabeth if she is still under treatment for breast cancer she can hopefully extended her SAMIR coverage as long as she is under treatment. I also told her that it appears to be in the early stages and is treatable and not let her cancer get to a stage 2. I also said it does not look like anymore MRIs were needed that surgery was the next step but to confirm this with the physician. She asked about dental and vision coverage and I told her that I do know if these are covered under [facility] and to call the number on the back of her card to see what her plan does cover including which MDs she can go to.

## 2018-12-10 ENCOUNTER — APPOINTMENT (OUTPATIENT)
Dept: PHYSICAL THERAPY | Age: 63
End: 2018-12-10
Payer: MEDICAID

## 2018-12-13 ENCOUNTER — TELEPHONE (OUTPATIENT)
Dept: SURGERY | Age: 63
End: 2018-12-13

## 2018-12-13 NOTE — TELEPHONE ENCOUNTER
Garima Diaz called  Ms Amado Joe was delaying surgery due to shoulder problems. Shoulder problems are not improving    Called patient  She is considering scheduling surgery within the next 2 months  She will call me back    Pt called Dec 22. She has some breast complaints after a biopsy. I told her she should schedule her surgery. She has not scheduled surgery yet.

## 2018-12-17 ENCOUNTER — HOSPITAL ENCOUNTER (OUTPATIENT)
Dept: PHYSICAL THERAPY | Age: 63
Discharge: HOME | End: 2018-12-17
Payer: MEDICAID

## 2018-12-17 PROCEDURE — 97110 THERAPEUTIC EXERCISES: CPT

## 2018-12-17 PROCEDURE — 97140 MANUAL THERAPY 1/> REGIONS: CPT

## 2018-12-17 NOTE — PROGRESS NOTES
PT ONCOLOGY DAILY NOTE    Patient Name: Kamryn Green  Date:2018  : 1955  [x]  Patient  Verified  Payor: Precious Greenfield / Plan: [facility] COMMUNITY PLAN SAMIR CCCP / Product Type: Managed Care Medicaid /    In time:1:30p  Out time: 2:30p  Total Treatment Time (min): 60  Total Timed Codes (min): 60  1:1 Treatment Time ([facility] only): 60   Visit #: 5 of 24    Treatment Area: Pain in right shoulder [M25.511]  Pain in left shoulder [M25.512]  Cervicalgia [M54.2]    SUBJECTIVE  Pain Level (0-10 scale): 5 constant  Any medication changes, allergies to medications, adverse drug reactions, diagnosis change, or new procedure performed?: [x] No    [] Yes (see summary sheet for update)  Subjective functional status/changes:   [] No changes reported  Patient reports she feels better when she does the exercises, but is really painful if she forgets to do them. Patient states she has been having some increased neck pain today that goes down into her shoulder. OBJECTIVE        30 min Therapeutic Exercise:  [x] See flow sheet :   Rationale: increase ROM and increase strength to improve the patients ability to lift, reach, carry, and complete ADL's      30 min Manual Therapy: PROM R shoulder, grade 1-2 mobs inferior. Posterior, scapular gliding, MFR UT, levator, SOR   Rationale: increase ROM and increase tissue extensibility, decrease pain and improve ROM  to improve the patients ability to lift, reach, carry, and complete ADL's         With   [] TE   [] TA   [] neuro   [] other: Patient Education: [x] Review HEP    [] Progressed/Changed HEP based on:   [] positioning   [] body mechanics   [] transfers   [] heat/ice application    [x] other: Advised patient to talk with Dr. Torin Rodney about questions related to surgery. Other Objective/Functional Measures:      Pain Level (0-10 scale) post treatment: 1 \"not at tight\"    ASSESSMENT/Changes in Function: Patient demonstrates good tolerance for interventions.  Patient with full ROM all directions with increased tightness with IR. Patient continues to require mod verbal cues for postural awareness and proper mechanics.      Patient will benefit from skilled PT services to modify and progress therapeutic interventions, address functional mobility deficits, address ROM deficits, address strength deficits, analyze and address soft tissue restrictions, analyze and cue movement patterns, analyze and modify body mechanics/ergonomics and assess and modify postural abnormalities to address rehab goals per plan of care            PLAN  [x]  Upgrade activities as tolerated     [x]  Continue plan of care  [x]  Update interventions per flow sheet       []  Discharge due to:_  []  Other:_      Jose Luis Meadows 12/17/2018  5:49 PM

## 2018-12-18 ENCOUNTER — DOCUMENTATION ONLY (OUTPATIENT)
Dept: FAMILY PLANNING/WOMEN'S HEALTH CLINIC | Age: 63
End: 2018-12-18

## 2018-12-18 NOTE — PROGRESS NOTES
Saw patient talked to Dr. Ariel Weathers on 12/13. I called to follow-up as I had not seen a date set yet. Pt said I did not call her at a good time but said she will call Dr. Ariel Weathers to set a date for her surgery regarding her breast cancer. I told her that was good and to call us if she needed anything or to talk.

## 2018-12-24 ENCOUNTER — OFFICE VISIT (OUTPATIENT)
Dept: FAMILY MEDICINE CLINIC | Age: 63
End: 2018-12-24

## 2018-12-24 VITALS
TEMPERATURE: 97.6 F | BODY MASS INDEX: 22.18 KG/M2 | HEIGHT: 66 IN | DIASTOLIC BLOOD PRESSURE: 68 MMHG | SYSTOLIC BLOOD PRESSURE: 105 MMHG | WEIGHT: 138 LBS | HEART RATE: 74 BPM | OXYGEN SATURATION: 98 % | RESPIRATION RATE: 18 BRPM

## 2018-12-24 DIAGNOSIS — C50.511 MALIGNANT NEOPLASM OF LOWER-OUTER QUADRANT OF RIGHT BREAST OF FEMALE, ESTROGEN RECEPTOR POSITIVE (HCC): ICD-10-CM

## 2018-12-24 DIAGNOSIS — J30.9 ALLERGIC RHINITIS, UNSPECIFIED SEASONALITY, UNSPECIFIED TRIGGER: Primary | ICD-10-CM

## 2018-12-24 DIAGNOSIS — Z17.0 MALIGNANT NEOPLASM OF LOWER-OUTER QUADRANT OF RIGHT BREAST OF FEMALE, ESTROGEN RECEPTOR POSITIVE (HCC): ICD-10-CM

## 2018-12-24 DIAGNOSIS — M25.511 ACUTE PAIN OF RIGHT SHOULDER: ICD-10-CM

## 2018-12-24 NOTE — PROGRESS NOTES
Chief Complaint   Patient presents with    Establish Care    Cough     Pt in office today to establish care    Pt has concerns about cough states she had strep 3wks ago and wants to ensure it is cleared    Pt states will have right breast cancer removal  in the future and has concerns    Pt has concerns about spots on left thigh (raised red place)  Pt has spot on right arm that is aggravated by water  Pt has stayed out of water in order to prevent a reaction    Pt states she has pain in neck and shoulder only when she moves it    Pt has no other concerns

## 2018-12-27 NOTE — PROGRESS NOTES
HISTORY OF PRESENT ILLNESS  Darwin Bravo is a 61 y.o. female. HPI  Pt in office today to establish care    Pt has concerns about cough states she had strep 3wks ago and wants to ensure it is cleared. Still has pnd but all secretions are clear and no fever. Pt states will have right breast cancer removal  in the future and has concerns. Wants to have a second opinion on what to do moving forward as far as surgeon. Believes the MRI breast biopsy caused swelling and pain and new lumps. Wants to be sure that the MRI did not make it worse. Bruising and swelling was significant but now resolved. Does have some pain still. Pt has concerns about spots on left thigh (raised red place)  Pt has spot on right arm that is aggravated by water  Pt has stayed out of water in order to prevent a reaction    Pt states she has pain in neck and shoulder only when she moves it. Can not raise above 90 degrees    The FamHx, SocHx, MedHx, Medication, and Allergy lists have been reviewed and updated in the chart. ROS  A comprehensive review of system was obtained and negative except findings in the HPI    Visit Vitals  /68 (BP 1 Location: Left arm, BP Patient Position: Sitting)   Pulse 74   Temp 97.6 °F (36.4 °C) (Oral)   Resp 18   Ht 5' 6\" (1.676 m)   Wt 138 lb (62.6 kg)   SpO2 98%   BMI 22.27 kg/m²     Physical Exam   Constitutional: She is oriented to person, place, and time. She appears well-developed and well-nourished. Neck: No JVD present. Cardiovascular: Normal rate, regular rhythm and intact distal pulses. Exam reveals no gallop and no friction rub. No murmur heard. Pulmonary/Chest: Effort normal and breath sounds normal. No respiratory distress. She has no wheezes. Right breast with very superficial cyst 3mm tiny in size at 9 oclock to the nipple, otherwise breast exam is completely normal with mass noted. Musculoskeletal: She exhibits no edema.    Neurological: She is alert and oriented to person, place, and time. Skin: Skin is warm. Nursing note and vitals reviewed. ASSESSMENT and PLAN  Encounter Diagnoses   Name Primary?  Allergic rhinitis, unspecified seasonality, unspecified trigger Yes    Malignant neoplasm of lower-outer quadrant of right breast of female, estrogen receptor positive ([facility] [address]     Acute pain of right shoulder      Orders Placed This Encounter    REFERRAL TO ORTHOPEDIC SURGERY    Right breast cancer     In detail discuss about how the MRI likely does not make breast cancer worse  Advised that MRI of the breast can cause pain and bruising as part of the biopsy. I am not sure understood the conversation, we did not seem to connect when explaining how the MRI would not make matters worse. Begged her to see Dr. Emelia Dorsey again to discuss biopsy plans and treatment and the risk of metastasis if she waits too long. Referral to Ortho for eval of the shoulder    I have discussed the diagnosis with the patient and the intended plan as seen in the above orders. The patient has received an after-visit summary and questions were answered concerning future plans. Patient conveyed understanding of the plan at the time of the visit.     Oracio Cesar, MSN, ANP  12/26/2018

## 2018-12-28 ENCOUNTER — APPOINTMENT (OUTPATIENT)
Dept: PHYSICAL THERAPY | Age: 63
End: 2018-12-28
Payer: MEDICAID

## 2019-01-03 ENCOUNTER — HOSPITAL ENCOUNTER (OUTPATIENT)
Dept: PHYSICAL THERAPY | Age: 64
Discharge: HOME | End: 2019-01-03
Payer: MEDICAID

## 2019-01-03 PROCEDURE — 97110 THERAPEUTIC EXERCISES: CPT

## 2019-01-03 PROCEDURE — 97140 MANUAL THERAPY 1/> REGIONS: CPT

## 2019-01-03 NOTE — PROGRESS NOTES
PT ONCOLOGY DAILY NOTE Patient Name: Arlin Ruiz Date:1/3/2019 : 1955 [x]  Patient  Verified Payor: [facility] MEDICAID / Plan: [employer] COMMUNITY PLAN SAMIR CCCP / Product Type: Managed Care Medicaid / In time:2:40p  Out time: 3:40p Total Treatment Time (min): 60 Total Timed Codes (min): 60 
1:1 Treatment Time ( [address] only): 60 Visit #: 6 of 24 Treatment Area: Pain in right shoulder [M25.511] Pain in left shoulder [M25.512] Cervicalgia [M54.2] SUBJECTIVE Pain Level (0-10 scale): 0/10, 1-2 with overhead lifting objects Any medication changes, allergies to medications, adverse drug reactions, diagnosis change, or new procedure performed?: [x] No    [] Yes (see summary sheet for update) Subjective functional status/changes:   [] No changes reported Patient reports she has been feeling much better since last session. Patient states she has somee pain when lifting object overhead like putting dishes in cupboards. OBJECTIVE 30 min Therapeutic Exercise:  [x] See flow sheet :  
Rationale: increase ROM and increase strength to improve the patients ability to lift, reach, carry, and complete ADL's 
 
 
30 min Manual Therapy: PROM R shoulder, grade 1-2 mobs inferior. Posterior, scapular gliding, MFR UT, levator, SOR, biceps, deltoids Rationale: increase ROM and increase tissue extensibility, decrease pain and improve ROM  to improve the patients ability to lift, reach, carry, and complete ADL's With 
 [] TE 
 [] TA 
 [] neuro 
 [] other: Patient Education: [x] Review HEP [] Progressed/Changed HEP based on:  
[] positioning   [] body mechanics   [] transfers   [] heat/ice application   
[x] other: Advised patient to talk with Dr. Kandee Ganser about questions related to surgery.    
 
Other Objective/Functional Measures:   
 
Pain Level (0-10 scale) post treatment: 0 
 
ASSESSMENT/Changes in Function: Patient demonstrates good tolerance for interventions. Patient with full PROM all directions with increased tightness with IR and improved AROM all directions. Patient with increased tissue turgor distal biceps. Patient continues to require mod verbal cues for postural awareness and proper mechanics. Patient will benefit from skilled PT services to modify and progress therapeutic interventions, address functional mobility deficits, address ROM deficits, address strength deficits, analyze and address soft tissue restrictions, analyze and cue movement patterns, analyze and modify body mechanics/ergonomics and assess and modify postural abnormalities to address rehab goals per plan of care PLAN [x]  Upgrade activities as tolerated     [x]  Continue plan of care [x]  Update interventions per flow sheet      
[]  Discharge due to:_ 
[]  Other:_ [place] Apa 1/3/2019  5:49 PM

## 2019-01-08 ENCOUNTER — HOSPITAL ENCOUNTER (OUTPATIENT)
Dept: PHYSICAL THERAPY | Age: 64
Discharge: HOME | End: 2019-01-08
Payer: MEDICAID

## 2019-01-08 PROCEDURE — 97110 THERAPEUTIC EXERCISES: CPT

## 2019-01-08 PROCEDURE — 97140 MANUAL THERAPY 1/> REGIONS: CPT

## 2019-01-08 NOTE — PROGRESS NOTES
PT ONCOLOGY DAILY NOTE Patient Name: Regina Worthy Date:2019 : 1955 [x]  Patient  Verified Payor: [facility] MEDICAID / Plan: [employer] COMMUNITY PLAN SAMIR CCCP / Product Type: Managed Care Medicaid / In time:1:35p  Out time: 2:35p Total Treatment Time (min): 60 Total Timed Codes (min): 60 
1:1 Treatment Time ( [address] only): 60 Visit #: 7 of 24 Treatment Area: Pain in right shoulder [M25.511] Pain in left shoulder [M25.512] Cervicalgia [M54.2] SUBJECTIVE Pain Level (0-10 scale): 0/10, 1 with overhead lifting objects Any medication changes, allergies to medications, adverse drug reactions, diagnosis change, or new procedure performed?: [x] No    [] Yes (see summary sheet for update) Subjective functional status/changes:   [] No changes reported Patient reports her shoulder is starting to feel better and noticed improved ROM. Patient states as the day progresses she has a more difficult time lifting overhead and has some pain. Patient reports she has called Dr. Shyanne Heller and is waiting to here back from them. OBJECTIVE 45 min Therapeutic Exercise:  [x] See flow sheet :  
Rationale: increase ROM and increase strength to improve the patients ability to lift, reach, carry, and complete ADL's 
 
 
15 min Manual Therapy:  Scapular gliding, MFR UT, levator, SOR, biceps, deltoids Rationale: increase ROM and increase tissue extensibility, decrease pain and improve ROM  to improve the patients ability to lift, reach, carry, and complete ADL's With 
 [] TE 
 [] TA 
 [] neuro 
 [] other: Patient Education: [x] Review HEP [] Progressed/Changed HEP based on:  
[] positioning   [] body mechanics   [] transfers   [] heat/ice application   
[x] other: Advised patient to talk with Dr. Lambert Lesch about questions related to surgery. Other Objective/Functional Measures: Foto Complete UPPER QUARTER MUSCLE STRENGTH 
    R  L 
C1, C2 Neck Flex   4+    
C3 Side Flex   4+   5 C4 Sh Elev   5  5 C5 Deltoid/Biceps  3-  5  
C6 Wrist Ext   4+  5 C7 Triceps   5  5 C8 Thumb Ext   5  5 
T1 Hand Inst   4+  5 Shoulder AROM: 
Flexion:   160*  170 Extension:   35  45 Abduction:   165,tight 180 ER:    70  85 IR:    55  65 *painful arc starting at 85 deg, once past 90 deg is able to complete ROM. Full PROM with no pain Pain Level (0-10 scale) post treatment: 0 
 
ASSESSMENT/Changes in Function: Patient demonstrates good tolerance for interventions. Patient with full PROM all directions with increased tightness with IR and improved AROM all directions. Patient with increased tissue turgor short head biceps. Patient continues to require mod verbal cues for postural awareness and proper mechanics. Patient will benefit from skilled PT services to modify and progress therapeutic interventions, address functional mobility deficits, address ROM deficits, address strength deficits, analyze and address soft tissue restrictions, analyze and cue movement patterns, analyze and modify body mechanics/ergonomics and assess and modify postural abnormalities to address rehab goals per plan of care PLAN [x]  Upgrade activities as tolerated     [x]  Continue plan of care [x]  Update interventions per flow sheet      
[]  Discharge due to:_ 
[]  Other:_ Najma Trent 1/8/2019  5:49 PM

## 2019-01-09 NOTE — PROGRESS NOTES
[facility] [address], [address] [place] Phone: [phone]      Fax:  [phone] Progress Note Name: Aziza Nicholson : 1955 MD: Linda Junior MD 
  
 
Treatment Diagnosis: Pain in right shoulder [M25.511] Pain in left shoulder [M25.512] Cervicalgia [M54.2] Start of Care: 18 Visits from Start of Care: 7 Missed Visits: 2 Summary of Care: Pt has received PT interventions to decrease shoulder and neck pain and to improve motion. She has made improvements towards the below rehab goals. Assessment / Recommendations: Recommending 4-5  additional visits to complete below rehab goals Short Term Goals: To be accomplished in 2 treatments: 
1) Pt will be Independent with skin care routine post operatively to decrease risk of infection. MET 
2) Pt will know which signs and symptoms to report immediately to physician concerning their condition. MET 
  
Long Term Goals:  
1) Pt will be Independent with HEP for  R shoulder pain and edema management, utilizing correct breath pattern, strengthening, and motion exercises in order to maintain gains achieved in therapy Progressing 2) Pt will utilize correct breath and movement patterns during all ADL's involving reaching above shoulder level with decreased pain by 2-3  levels on NPS. MET 
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 
 
________________________________________________________________________ NOTE TO PHYSICIAN:  Please complete the following and fax to: [facility]: [phone] . Retain this original for your records. If you are unable to process this request in 24 hours, please contact our office. ____ I have read the above report and request that my patient continue therapy with the following changes/special instructions: 
____ I have read the above report and request that my patient be discharged from therapy [de-identified] Signature:_________________ Date:___________Time:__________

## 2019-01-11 ENCOUNTER — HOSPITAL ENCOUNTER (OUTPATIENT)
Dept: PHYSICAL THERAPY | Age: 64
Discharge: HOME | End: 2019-01-11
Payer: MEDICAID

## 2019-01-11 PROCEDURE — 97140 MANUAL THERAPY 1/> REGIONS: CPT

## 2019-01-11 PROCEDURE — 97110 THERAPEUTIC EXERCISES: CPT

## 2019-01-11 NOTE — PROGRESS NOTES
PT ONCOLOGY DAILY NOTE Patient Name: Yamile Mckinley Date:2019 : 1955 [x]  Patient  Verified Payor: [facility] MEDICAID / Plan: [employer] COMMUNITY PLAN SAMIR CCCP / Product Type: Managed Care Medicaid / In time:8:15a Out time: 9:15a Total Treatment Time (min): 60 Total Timed Codes (min): 60 
1:1 Treatment Time ([address] only): 60 Visit #: 7 of 24 Treatment Area: Pain in right shoulder [M25.511] Pain in left shoulder [M25.512] Cervicalgia [M54.2] SUBJECTIVE Pain Level (0-10 scale): 0/10, 1 with overhead lifting objects Any medication changes, allergies to medications, adverse drug reactions, diagnosis change, or new procedure performed?: [x] No    [] Yes (see summary sheet for update) Subjective functional status/changes:   [] No changes reported PAtient reports she is getting a second opinion and has an appointment next week. OBJECTIVE 45 min Therapeutic Exercise:  [x] See flow sheet :  
Rationale: increase ROM and increase strength to improve the patients ability to lift, reach, carry, and complete ADL's 
 
 
15 min Manual Therapy:  Scapular gliding, MFR UT, levator, SOR, biceps, rhomboids Rationale: increase ROM and increase tissue extensibility, decrease pain and improve ROM  to improve the patients ability to lift, reach, carry, and complete ADL's With 
 [] TE 
 [] TA 
 [] neuro 
 [] other: Patient Education: [x] Review HEP [] Progressed/Changed HEP based on:  
[] positioning   [] body mechanics   [] transfers   [] heat/ice application   
[x] other: Advised patient to talk with Dr. Nahum Mcgraw about questions related to surgery. Other Objective/Functional Measures:   
  
Pain Level (0-10 scale) post treatment: 0 
 
ASSESSMENT/Changes in Function: Patient demonstrates good tolerance for advanced interventions. Patient with increased tissue turgor short head biceps. Patient continues to require mod verbal cues for postural awareness and proper mechanics. Patient will benefit from skilled PT services to modify and progress therapeutic interventions, address functional mobility deficits, address ROM deficits, address strength deficits, analyze and address soft tissue restrictions, analyze and cue movement patterns, analyze and modify body mechanics/ergonomics and assess and modify postural abnormalities to address rehab goals per plan of care PLAN [x]  Upgrade activities as tolerated     [x]  Continue plan of care [x]  Update interventions per flow sheet      
[]  Discharge due to:_ 
[]  Other:_ Letty Dumont 1/11/2019  5:49 PM

## 2019-01-14 ENCOUNTER — HOSPITAL ENCOUNTER (OUTPATIENT)
Dept: PHYSICAL THERAPY | Age: 64
Discharge: HOME | End: 2019-01-14
Payer: MEDICAID

## 2019-01-14 PROCEDURE — 97110 THERAPEUTIC EXERCISES: CPT

## 2019-01-14 PROCEDURE — 97140 MANUAL THERAPY 1/> REGIONS: CPT

## 2019-01-14 NOTE — PROGRESS NOTES
PT ONCOLOGY DAILY NOTE Patient Name: Liana Walters Date:2019 : 1955 [x]  Patient  Verified Payor: [facility] MEDICAID / Plan: [employer] COMMUNITY [employer]AN SAMIR CCCP / Product Type: Managed Care Medicaid / In time:11:10a Out time: 12:10a Total Treatment Time (min): 60 Total Timed Codes (min): 60 
1:1 Treatment Time ( [address] only): 60 Visit #: 9 of 24 Treatment Area: Pain in right shoulder [M25.511] Pain in left shoulder [M25.512] Cervicalgia [M54.2] SUBJECTIVE Pain Level (0-10 scale): 0/10, 1 with overhead lifting objects Any medication changes, allergies to medications, adverse drug reactions, diagnosis change, or new procedure performed?: [x] No    [] Yes (see summary sheet for update) Subjective functional status/changes:   [] No changes reported Patient reports her arm was a little sore and agrrivated over the weekend from the last session and feels the biceps curls started it. OBJECTIVE 45 min Therapeutic Exercise:  [x] See flow sheet :  
Rationale: increase ROM and increase strength to improve the patients ability to lift, reach, carry, and complete ADL's 
 
 
15 min Manual Therapy:  Scapular gliding, MFR UT, levator, SOR, biceps, rhomboids, grade II GH jt mobs Rationale: increase ROM and increase tissue extensibility, decrease pain and improve ROM  to improve the patients ability to lift, reach, carry, and complete ADL's With 
 [] TE 
 [] TA 
 [] neuro 
 [] other: Patient Education: [x] Review HEP [] Progressed/Changed HEP based on:  
[] positioning   [] body mechanics   [] transfers   [] heat/ice application   
[] other:   
 
Other Objective/Functional Measures: no pain with AROM bicep curls Pain Level (0-10 scale) post treatment: 0 
 
ASSESSMENT/Changes in Function: . Patient continues to present with compensatory strategies with overhead lifting and required mod verbal and tactile cues for proper mechanics.  Reviewed HEP exercises and mechanics to ensure proper muscle activation without compensations. Patient is making good progress towards goals with improvements in overall ROM and functional ability with ADL's. Patient will do well with continued PT and progression to reaching remaining goals and further functional improvements. Patient will benefit from skilled PT services to modify and progress therapeutic interventions, address functional mobility deficits, address ROM deficits, address strength deficits, analyze and address soft tissue restrictions, analyze and cue movement patterns, analyze and modify body mechanics/ergonomics and assess and modify postural abnormalities to address rehab goals per plan of care PLAN [x]  Upgrade activities as tolerated     [x]  Continue plan of care [x]  Update interventions per flow sheet      
[]  Discharge due to:_ 
[]  Other:_ [facility] 1/14/2019  5:49 PM

## 2019-01-15 ENCOUNTER — DOCUMENTATION ONLY (OUTPATIENT)
Dept: SURGERY | Age: 64
End: 2019-01-15

## 2019-01-15 ENCOUNTER — APPOINTMENT (OUTPATIENT)
Dept: PHYSICAL THERAPY | Age: 64
End: 2019-01-15
Payer: MEDICAID

## 2019-01-17 ENCOUNTER — APPOINTMENT (OUTPATIENT)
Dept: PHYSICAL THERAPY | Age: 64
End: 2019-01-17
Payer: MEDICAID

## 2019-01-18 ENCOUNTER — HOSPITAL ENCOUNTER (OUTPATIENT)
Dept: PHYSICAL THERAPY | Age: 64
Discharge: HOME | End: 2019-01-18
Payer: MEDICAID

## 2019-01-18 PROCEDURE — 97140 MANUAL THERAPY 1/> REGIONS: CPT

## 2019-01-18 PROCEDURE — 97110 THERAPEUTIC EXERCISES: CPT

## 2019-01-18 NOTE — PROGRESS NOTES
PT ONCOLOGY DAILY NOTE Patient Name: Genia Leon Date:2019 : 1955 [x]  Patient  Verified Payor: [facility] MEDICAID / Plan: [place] [employer] / Product Type: Managed Care Medicaid / In time:8:20a Out time: 9:20a Total Treatment Time (min): 60 Total Timed Codes (min): 60 
1:1 Treatment Time ( [address] only): 60 Visit #: 10 of 24 Treatment Area: Pain in right shoulder [M25.511] Pain in left shoulder [M25.512] Cervicalgia [M54.2] SUBJECTIVE Pain Level (0-10 scale): 0/10, 1 with overhead lifting objects Any medication changes, allergies to medications, adverse drug reactions, diagnosis change, or new procedure performed?: [x] No    [] Yes (see summary sheet for update) Subjective functional status/changes:   [] No changes reported Patient reports she has been more stressed out with work as she has more demands and has not been sleeping well as a result. Patient states her neck is really tight and sore today. Patient reports she continues to have a catch with overhead lifting that is better some days and worse others. Patient states she is seeing Dr. Dalila Moncada. OBJECTIVE 45 min Therapeutic Exercise:  [x] See flow sheet :  
Rationale: increase ROM and increase strength to improve the patients ability to lift, reach, carry, and complete ADL's 
 
 
15 min Manual Therapy:  Scapular gliding, MFR UT, levator, SOR, biceps, rhomboids, grade II GH jt mobs Rationale: increase ROM and increase tissue extensibility, decrease pain and improve ROM  to improve the patients ability to lift, reach, carry, and complete ADL's With 
 [] TE 
 [] TA 
 [] neuro 
 [] other: Patient Education: [x] Review HEP [] Progressed/Changed HEP based on:  
[] positioning   [] body mechanics   [] transfers   [] heat/ice application   
[] other:   
 
Other Objective/Functional Measures: no pain with AROM bicep curls Pain Level (0-10 scale) post treatment: 0 
 
 ASSESSMENT/Changes in Function: . Patient continues to present with compensatory strategies with overhead lifting and required mod verbal and tactile cues for proper mechanics. Patient is making good progress towards goals with improvements in overall ROM and functional ability with ADL's. Patient will do well with continued PT and progression to reaching remaining goals and further functional improvements. Patient will benefit from skilled PT services to modify and progress therapeutic interventions, address functional mobility deficits, address ROM deficits, address strength deficits, analyze and address soft tissue restrictions, analyze and cue movement patterns, analyze and modify body mechanics/ergonomics and assess and modify postural abnormalities to address rehab goals per plan of care PLAN [x]  Upgrade activities as tolerated     [x]  Continue plan of care [x]  Update interventions per flow sheet      
[]  Discharge due to:_ 
[]  Other:_ Mose Primrose 1/18/2019  5:49 PM

## 2019-01-21 ENCOUNTER — HOSPITAL ENCOUNTER (OUTPATIENT)
Dept: PHYSICAL THERAPY | Age: 64
Discharge: HOME | End: 2019-01-21
Payer: MEDICAID

## 2019-01-21 PROCEDURE — 97110 THERAPEUTIC EXERCISES: CPT

## 2019-01-21 PROCEDURE — 97140 MANUAL THERAPY 1/> REGIONS: CPT

## 2019-01-21 NOTE — PROGRESS NOTES
PT ONCOLOGY DAILY NOTE Patient Name: Nuha Burgess Date:2019 : 1955 [x]  Patient  Verified Payor: [facility] MEDICAID / Plan: [employer] / Product Type: Managed Care Medicaid / In time:5:00p Out time: 6:00p Total Treatment Time (min): 60 Total Timed Codes (min): 60 
1:1 Treatment Time ( [address] only): 60 Visit #: 56 WN 04 Treatment Area: Pain in right shoulder [M25.511] Pain in left shoulder [M25.512] Cervicalgia [M54.2] SUBJECTIVE Pain Level (0-10 scale): 0/10, 1 with overhead lifting objects Any medication changes, allergies to medications, adverse drug reactions, diagnosis change, or new procedure performed?: [x] No    [] Yes (see summary sheet for update) Subjective functional status/changes:   [] No changes reported Patient reports she has been feeling good since last visit with little pain with overhead reaching. Patient reports Dr. Susie Crockett agreed with Dr. Fran Goldmann about needing surgery and was \"very direct with her about getting it ASAP. \" Patient states she had a biopsy in her R biceps with Dr. Susie Crockett and gets the results back on Friday with her follow up. Patient states she is planning on going back to Dr. Fran Goldmann in 2 weeks to discuss surgery. OBJECTIVE 45 min Therapeutic Exercise:  [x] See flow sheet :  
Rationale: increase ROM and increase strength to improve the patients ability to lift, reach, carry, and complete ADL's 
 
 
15 min Manual Therapy:  Scapular gliding, MFR UT, levator, SOR, deltoids, rhomboids, grade II GH jt mobs Rationale: increase ROM and increase tissue extensibility, decrease pain and improve ROM  to improve the patients ability to lift, reach, carry, and complete ADL's With 
 [] TE 
 [] TA 
 [] neuro 
 [] other: Patient Education: [x] Review HEP [] Progressed/Changed HEP based on:  
[] positioning   [] body mechanics   [] transfers   [] heat/ice application   
[] other: Other Objective/Functional Measures: did not perform bicep curls today due to recent biopsy Pain Level (0-10 scale) post treatment: 0 
 
ASSESSMENT/Changes in Function: Patient continues to make progress toward full ROM with decreased compensations. Patient has intermittent pain with some overhead lifting activities, but overall has decreased in occurrence and intensity. Patient to schedule out 2 more weeks until possible surgery/follow up with Dr. Reyna Díaz. Patient encouraged to schedule follow up soon with Dr. Reyna Díaz and to get surgery scheduled. Patient will benefit from skilled PT services to modify and progress therapeutic interventions, address functional mobility deficits, address ROM deficits, address strength deficits, analyze and address soft tissue restrictions, analyze and cue movement patterns, analyze and modify body mechanics/ergonomics and assess and modify postural abnormalities to address rehab goals per plan of care PLAN [x]  Upgrade activities as tolerated     [x]  Continue plan of care [x]  Update interventions per flow sheet      
[]  Discharge due to:_ 
[]  Other:_ Freedom Winkler 1/21/2019  5:49 PM

## 2019-01-25 ENCOUNTER — HOSPITAL ENCOUNTER (OUTPATIENT)
Dept: PHYSICAL THERAPY | Age: 64
Discharge: HOME | End: 2019-01-25
Payer: MEDICAID

## 2019-01-25 PROCEDURE — 97140 MANUAL THERAPY 1/> REGIONS: CPT

## 2019-01-25 PROCEDURE — 97110 THERAPEUTIC EXERCISES: CPT

## 2019-01-25 NOTE — PROGRESS NOTES
PT ONCOLOGY DAILY NOTE Patient Name: Irina Sen Date:2019 : 1955 [x]  Patient  Verified Payor: [facility] MEDICAID / Plan: [employer] / Product Type: Managed Care Medicaid / In time:7:00a Out time: 8:00a Total Treatment Time (min): 60 Total Timed Codes (min): 60 
1:1 Treatment Time ( [address] only): 60 Visit #: 12 of 24 Treatment Area: Pain in right shoulder [M25.511] Pain in left shoulder [M25.512] Cervicalgia [M54.2] SUBJECTIVE Pain Level (0-10 scale): 0/10, 1 with overhead lifting objects Any medication changes, allergies to medications, adverse drug reactions, diagnosis change, or new procedure performed?: [x] No    [] Yes (see summary sheet for update) Subjective functional status/changes:   [] No changes reported Patient reports she has a follow up with Dr. Karin Pollock to get biopsy results and stitch out. Patient states she has not mad ethe follow up with Dr. Ila Christiansen, but plans on calling after her follow up. Patient reports she hurt her shoulder going down a slide with a child she is watching, but feels better today. Patient states she just has some soreness between her shoulder blades. OBJECTIVE 45 min Therapeutic Exercise:  [x] See flow sheet :  
Rationale: increase ROM and increase strength to improve the patients ability to lift, reach, carry, and complete ADL's 
 
 
15 min Manual Therapy:  Scapular gliding, MFR UT, levator, SOR, rhomboids, grade II GH jt mobs Rationale: increase ROM and increase tissue extensibility, decrease pain and improve ROM  to improve the patients ability to lift, reach, carry, and complete ADL's With 
 [] TE 
 [] TA 
 [] neuro 
 [] other: Patient Education: [x] Review HEP [] Progressed/Changed HEP based on:  
[] positioning   [] body mechanics   [] transfers   [] heat/ice application   
[] other:   
 
Other Objective/Functional Measures: pt HEP updated Pain Level (0-10 scale) post treatment: 0 
 
 ASSESSMENT/Changes in Function: Patient continues to make progress toward full AROM with decreased compensations. Patient has intermittent pain with some overhead lifting activities, but overall has decreased in occurrence and intensity. Encouraged patient to make appointment with Dr. Erendira Hackett for next week after she follows up with Dr. Jonnathan Kidd today and gets the results of her biopsy. Patient will benefit from skilled PT services to modify and progress therapeutic interventions, address functional mobility deficits, address ROM deficits, address strength deficits, analyze and address soft tissue restrictions, analyze and cue movement patterns, analyze and modify body mechanics/ergonomics and assess and modify postural abnormalities to address rehab goals per plan of care PLAN [x]  Upgrade activities as tolerated     [x]  Continue plan of care [x]  Update interventions per flow sheet      
[]  Discharge due to:_ 
[]  Other:_ Luis Chinchilla 1/25/2019

## 2019-01-28 ENCOUNTER — HOSPITAL ENCOUNTER (OUTPATIENT)
Dept: PHYSICAL THERAPY | Age: 64
Discharge: HOME | End: 2019-01-28
Payer: MEDICAID

## 2019-01-28 PROCEDURE — 97110 THERAPEUTIC EXERCISES: CPT

## 2019-01-28 NOTE — PROGRESS NOTES
PT ONCOLOGY DAILY NOTE Patient Name: Emi Chacon Date:2019 : 1955 [x]  Patient  Verified Payor: [facility] MEDICAID / Plan: [employer] / Product Type: Managed Care Medicaid / In time:11:45a Out time: 12:40a Total Treatment Time (min): 55 Total Timed Codes (min): 55 
1:1 Treatment Time ( only): 55 Visit #: 60 PF 85 Treatment Area: Pain in right shoulder [M25.511] Pain in left shoulder [M25.512] Cervicalgia [M54.2] SUBJECTIVE Pain Level (0-10 scale): 0/10, 1 with overhead lifting objects Any medication changes, allergies to medications, adverse drug reactions, diagnosis change, or new procedure performed?: [x] No    [] Yes (see summary sheet for update) Subjective functional status/changes:   [] No changes reported Patient reports she continues to have a catch in her arm, but is able to move through the motion with less assistance and decreased pain. Patient reports the biopsy came back basal carcinoma. Patient states she is getting \"conflicting information from her doctor. Patient states Dr. Victor M Barker would do Chemo and Dr. José Luis Nguyen would not and she is now unsure what she wants to do and is going to do more research. \" OBJECTIVE 55 min Therapeutic Exercise:  [x] See flow sheet :  
Rationale: increase ROM and increase strength to improve the patients ability to lift, reach, carry, and complete ADL's With 
 [] TE 
 [] TA 
 [] neuro 
 [] other: Patient Education: [x] Review HEP [] Progressed/Changed HEP based on:  
[] positioning   [] body mechanics   [] transfers   [] heat/ice application   
[] other:   
 
Other Objective/Functional Measures: pt HEP updated Pain Level (0-10 scale) post treatment: 0 
 
ASSESSMENT/Changes in Function: Encouraged patient to schedule appointment with Dr. José Luis Nguyen to go over new results from biopsy and different treatment plans of Dr. Victor M Barker.  Patient continues to reports the need to do more research before scheduling surgery with Dr. Gypsy Cobian. Discussed the possibility of calling Dr. Gypsy Cobian or Dr. Debbie Armenta for best sites to go to for accurate information. Patient states she is going to ask a fellow [employer] goer who is a retired Oncologist to advise about her disease. Patient advanced exercises with increased fatigue noted. Will give updated HEP for before and after surgery. Will look to discharge patient to HEP over then next several weeks. Patient will benefit from skilled PT services to modify and progress therapeutic interventions, address functional mobility deficits, address ROM deficits, address strength deficits, analyze and address soft tissue restrictions, analyze and cue movement patterns, analyze and modify body mechanics/ergonomics and assess and modify postural abnormalities to address rehab goals per plan of care PLAN [x]  Upgrade activities as tolerated     [x]  Continue plan of care [x]  Update interventions per flow sheet      
[]  Discharge due to:_ 
[]  Other:_ Mario Allison 1/28/2019

## 2019-02-01 ENCOUNTER — HOSPITAL ENCOUNTER (OUTPATIENT)
Dept: PHYSICAL THERAPY | Age: 64
Discharge: HOME | End: 2019-02-01
Payer: MEDICAID

## 2019-02-01 PROCEDURE — 97110 THERAPEUTIC EXERCISES: CPT

## 2019-02-01 NOTE — PROGRESS NOTES
PT ONCOLOGY DAILY NOTE Patient Name: Jade Braun Date:2019 : 1955 [x]  Patient  Verified Payor: [facility] MEDICAID / Plan: [employer] / Product Type: Managed Care Medicaid / In time:10:40a Out time: 11:35a Total Treatment Time (min): 55 Total Timed Codes (min): 55 
1:1 Treatment Time ( only): 55 Visit #: 14 of 24 Treatment Area: Pain in right shoulder [M25.511] Pain in left shoulder [M25.512] Cervicalgia [M54.2] SUBJECTIVE Pain Level (0-10 scale): 0/10, 2 with overhead lifting objects Any medication changes, allergies to medications, adverse drug reactions, diagnosis change, or new procedure performed?: [x] No    [] Yes (see summary sheet for update) Subjective functional status/changes:   [] No changes reported Patient reports she was able to lift overhead on Wednesday and is having more difficulty today. Patient states she is going to call Dr. Reyna Díaz to schedule an appointment today. OBJECTIVE 55 min Therapeutic Exercise:  [x] See flow sheet :  
Rationale: increase ROM and increase strength to improve the patients ability to lift, reach, carry, and complete ADL's With 
 [] TE 
 [] TA 
 [] neuro 
 [] other: Patient Education: [x] Review HEP [] Progressed/Changed HEP based on:  
[] positioning   [] body mechanics   [] transfers   [] heat/ice application   
[] other:   
 
Other Objective/Functional Measures:  
  
Pain Level (0-10 scale) post treatment: 0 
 
ASSESSMENT/Changes in Function: Patient with full PROM and AROM with min pain with flexion with increased fatigue. Patient will do well with continued focus on ROM activities for HEP. Patient to schedule appointment with Dr. Reyna Díaz for surgery at which point she will be given new HEP to to allow for continued improvements in ROM post surgically. Patient to be seen 2x next week and discharged to [facility].   
 
Patient will benefit from skilled PT services to modify and progress therapeutic interventions, address functional mobility deficits, address ROM deficits, address strength deficits, analyze and address soft tissue restrictions, analyze and cue movement patterns, analyze and modify body mechanics/ergonomics and assess and modify postural abnormalities to address rehab goals per plan of care PLAN [x]  Upgrade activities as tolerated     [x]  Continue plan of care [x]  Update interventions per flow sheet      
[]  Discharge due to:_ 
[]  Other:_ Tonya Maza 2/1/2019

## 2019-02-04 ENCOUNTER — HOSPITAL ENCOUNTER (OUTPATIENT)
Dept: PHYSICAL THERAPY | Age: 64
Discharge: HOME | End: 2019-02-04
Payer: MEDICAID

## 2019-02-04 PROCEDURE — 97110 THERAPEUTIC EXERCISES: CPT

## 2019-02-04 NOTE — PROGRESS NOTES
PT ONCOLOGY DAILY NOTE Patient Name: Gerard Wick Date:2019 : 1955 [x]  Patient  Verified Payor: [facility] MEDICAID / Plan: [facility] PLAN [facility] / Product Type: Managed Care Medicaid / In time:1:35p Out time: 2:30p Total Treatment Time (min): 55 Total Timed Codes (min): 55 
1:1 Treatment Time ( only): 55 Visit #: 15 of 24 Treatment Area: Pain in right shoulder [M25.511] Pain in left shoulder [M25.512] Cervicalgia [M54.2] SUBJECTIVE Pain Level (0-10 scale): 0/10, 1 with overhead lifting objects Any medication changes, allergies to medications, adverse drug reactions, diagnosis change, or new procedure performed?: [x] No    [] Yes (see summary sheet for update) Subjective functional status/changes:   [] No changes reported Patient reports she is able to lift overhead better with no pain and is getting better with dressing with no pain. Patient states she is scheduled to see Dr. Masoud Pitts on Thursday. OBJECTIVE 55 min Therapeutic Exercise:  [x] See flow sheet :  
Rationale: increase ROM and increase strength to improve the patients ability to lift, reach, carry, and complete ADL's With 
 [] TE 
 [] TA 
 [] neuro 
 [] other: Patient Education: [x] Review HEP [] Progressed/Changed HEP based on:  
[] positioning   [] body mechanics   [] transfers   [] heat/ice application   
[] other:   
 
Other Objective/Functional Measures:  
  
Pain Level (0-10 scale) post treatment: 0 
 
ASSESSMENT/Changes in Function: Patient demonstrates improved ability to reach overhead with several activities today, however does have some pain with increased fatigue and requires cuing to stop activity and rest.  
 
Patient will benefit from skilled PT services to modify and progress therapeutic interventions, address functional mobility deficits, address ROM deficits, address strength deficits, analyze and address soft tissue restrictions, analyze and cue movement patterns, analyze and modify body mechanics/ergonomics and assess and modify postural abnormalities to address rehab goals per plan of care Short Term Goals: To be accomplished in 2 treatments: 
1) Pt will be Independent with skin care routine post operatively to decrease risk of infection. MET 
2) Pt will know which signs and symptoms to report immediately to physician concerning their condition. MET 
  
Long Term Goals:  
1) Pt will be Independent with HEP for  R shoulder pain and edema management, utilizing correct breath pattern, strengthening, and motion exercises in order to maintain gains achieved in therapy Progressing 2) Pt will utilize correct breath and movement patterns during all ADL's involving reaching above shoulder level with decreased pain by 2-3  levels on NPS. MET 
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 
  
 
 
PLAN [x]  Upgrade activities as tolerated     [x]  Continue plan of care [x]  Update interventions per flow sheet      
[]  Discharge due to:_ 
[x]  Other:_  Will go over goals and measurements in preparation for D/C next visit Melissa Navarrete 2/4/2019

## 2019-02-07 ENCOUNTER — OFFICE VISIT (OUTPATIENT)
Dept: SURGERY | Age: 64
End: 2019-02-07

## 2019-02-07 VITALS
HEIGHT: 66 IN | SYSTOLIC BLOOD PRESSURE: 147 MMHG | BODY MASS INDEX: 22.18 KG/M2 | HEART RATE: 77 BPM | WEIGHT: 138 LBS | DIASTOLIC BLOOD PRESSURE: 78 MMHG

## 2019-02-07 DIAGNOSIS — Z17.0 MALIGNANT NEOPLASM OF LOWER-OUTER QUADRANT OF RIGHT BREAST OF FEMALE, ESTROGEN RECEPTOR POSITIVE (HCC): Primary | ICD-10-CM

## 2019-02-07 DIAGNOSIS — C50.511 MALIGNANT NEOPLASM OF LOWER-OUTER QUADRANT OF RIGHT BREAST OF FEMALE, ESTROGEN RECEPTOR POSITIVE (HCC): Primary | ICD-10-CM

## 2019-02-07 NOTE — PATIENT INSTRUCTIONS

## 2019-02-08 ENCOUNTER — HOSPITAL ENCOUNTER (OUTPATIENT)
Dept: PHYSICAL THERAPY | Age: 64
Discharge: HOME | End: 2019-02-08
Payer: MEDICAID

## 2019-02-08 PROCEDURE — 97110 THERAPEUTIC EXERCISES: CPT

## 2019-02-08 NOTE — PROGRESS NOTES
PT ONCOLOGY DAILY NOTE Patient Name: Maria Del Rosario Romero Date:2019 : 1955 [x]  Patient  Verified Payor: [facility] MEDICAID / Plan: [employer] / Product Type: Managed Care Medicaid / In time:11:45 Out time: 12:35p Total Treatment Time (min): 55 Total Timed Codes (min): 55 
1:1 Treatment Time ( only): 55 Visit #: 70 SN 46 Treatment Area: Pain in right shoulder [M25.511] Pain in left shoulder [M25.512] Cervicalgia [M54.2] SUBJECTIVE Pain Level (0-10 scale): 0/10, 1 with overhead lifting objects Any medication changes, allergies to medications, adverse drug reactions, diagnosis change, or new procedure performed?: [x] No    [] Yes (see summary sheet for update) Subjective functional status/changes:   [] No changes reported Patient reports she has been feeling good. Patient repotrs she had her follow up with Dr. Curtis Lerma and was told the area of concern is worse than they originally thought and is more upset by that today. OBJECTIVE 55 min Therapeutic Exercise:  [x] See flow sheet : reviewed and updated HEP to perform pre and post surgery Rationale: increase ROM and increase strength to improve the patients ability to lift, reach, carry, and complete ADL's With 
 [] TE 
 [] TA 
 [] neuro 
 [] other: Patient Education: [x] Review HEP [] Progressed/Changed HEP based on:  
[] positioning   [] body mechanics   [] transfers   [] heat/ice application   
[] other:   
 
Other Objective/Functional Measures:  
  
Pain Level (0-10 scale) post treatment: 0 
 
ASSESSMENT/Changes in Function: Patient demonstrates improved ability to reach overhead with several activities today, however does have some pain with increased fatigue and requires cuing to stop activity and rest.  
 
Patient will benefit from skilled PT services to modify and progress therapeutic interventions, address functional mobility deficits, address ROM deficits, address strength deficits, analyze and address soft tissue restrictions, analyze and cue movement patterns, analyze and modify body mechanics/ergonomics and assess and modify postural abnormalities to address rehab goals per plan of care Short Term Goals: To be accomplished in 2 treatments: 
1) Pt will be Independent with skin care routine post operatively to decrease risk of infection. MET 
2) Pt will know which signs and symptoms to report immediately to physician concerning their condition. MET 
  
Long Term Goals:  
1) Pt will be Independent with HEP for  R shoulder pain and edema management, utilizing correct breath pattern, strengthening, and motion exercises in order to maintain gains achieved in therapy Met 
2) Pt will utilize correct breath and movement patterns during all ADL's involving reaching above shoulder level with decreased pain by 2-3  levels on NPS. MET 
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 
  
 
 
PLAN 
[]  Upgrade activities as tolerated     []  Continue plan of care 
[]  Update interventions per flow sheet [x]  Discharge due to:_ surgery scheduled 
[]  Other:_ Chicho Latham 2/8/2019

## 2019-02-20 ENCOUNTER — TELEPHONE (OUTPATIENT)
Dept: FAMILY PLANNING/WOMEN'S HEALTH CLINIC | Age: 64
End: 2019-02-20

## 2019-02-20 NOTE — TELEPHONE ENCOUNTER
Called patient to talk to her about her breast cancer treatment (or really lack of) and how sheis receiving Medicaid for treatment and that funds need to used properly. The time which has lapsed between her diagnosis and not getting any treatment is getting to be too long that we may need to call DSS to let them know the situation. I told her I wish I did not have to make this call but we are following her under our hanh and need her to call our office and talk to myself or Trung Farfan.

## 2019-02-27 ENCOUNTER — TELEPHONE (OUTPATIENT)
Dept: FAMILY PLANNING/WOMEN'S HEALTH CLINIC | Age: 64
End: 2019-02-27

## 2019-02-28 ENCOUNTER — TELEPHONE (OUTPATIENT)
Dept: FAMILY PLANNING/WOMEN'S HEALTH CLINIC | Age: 64
End: 2019-02-28

## 2019-02-28 NOTE — TELEPHONE ENCOUNTER
Called patient back to talk about her need for treatment for her breast cancer. Left a message to call out office.

## 2019-03-07 NOTE — TELEPHONE ENCOUNTER
Spoke to pt after she returned our call to her. Explained to her that we got her into Medicaid through our program so that her treatment would be covered under this insurance. She has still not started treatment and isn't sure if she is going to or not. She stated she still wants to talk with a pathologist, an oncologist and join a support group. Advised her to call the DR [facility]ELCHOR T[facility] and discuss with them. Per our hanh, one can not be on /stay on Medicaid if she is not receiving treatment and that we must notify DSS if this occurs. I advised her of this and explained we would be calling DSS . She is to notify us of any further plans for treatment. I did encourage her to consider starting treatment soon.

## 2019-03-11 ENCOUNTER — TELEPHONE (OUTPATIENT)
Dept: FAMILY PLANNING/WOMEN'S HEALTH CLINIC | Age: 64
End: 2019-03-11

## 2019-03-13 NOTE — TELEPHONE ENCOUNTER
Left VM with pt's DSS, [place], informing them that pt has not started treatment yet for her breast cancer, as we are required to do per our [address] since we got her into [facility] for this. Message was left with supervisor Surya Burton and she was also asked to call us back.

## 2019-03-25 ENCOUNTER — TELEPHONE (OUTPATIENT)
Dept: SURGERY | Age: 64
End: 2019-03-25

## 2019-03-25 DIAGNOSIS — C50.511 MALIGNANT NEOPLASM OF LOWER-OUTER QUADRANT OF RIGHT BREAST OF FEMALE, ESTROGEN RECEPTOR POSITIVE (HCC): Primary | ICD-10-CM

## 2019-03-25 DIAGNOSIS — Z17.0 MALIGNANT NEOPLASM OF LOWER-OUTER QUADRANT OF RIGHT BREAST OF FEMALE, ESTROGEN RECEPTOR POSITIVE (HCC): Primary | ICD-10-CM

## 2019-03-26 NOTE — PROGRESS NOTES
[facility] [address], [address] [place] Phone: [phone]  Fax: [phone] Medicaid Discharge Summary  2-15 Patient name: Rockwell Schaumann  : 1955  Provider#: [id number] Referral source: Love Gil MD     
Medical/Treatment Diagnosis: Pain in right shoulder [M25.511] Pain in left shoulder [M25.512] Cervicalgia [M54.2] Prior Hospitalization: see medical history Comorbidities: See Plan of Care Prior Level of Function:See Plan of Care Medications: Verified on Patient Summary List 
 
Start of Care: 18      Onset Date:19 Visits from Start of Care: 16    Missed Visits: 5 Reporting Period : 18 to 19 ASSESSMENT/SUMMARY OF CARE: Patient participated in a combination of strengthening, AROM and manual therapy. Pt continues to struggle with decision of surgery. Recommending d/c PT d/t lack of appreciable progress towards rehab goals. GOAL STATUS: 
Short Term Goals: 
1) Pt will be Independent with skin care routine post operatively to decrease risk of infection. MET 
2) Pt will know which signs and symptoms to report immediately to physician concerning their condition. MET 
  
Long Term Goals:  
1) Pt will be Independent with HEP for  R shoulder pain and edema management, utilizing correct breath pattern, strengthening, and motion exercises in order to maintain gains achieved in therapy Met 
2) Pt will utilize correct breath and movement patterns during all ADL's involving reaching above shoulder level with decreased pain by 2-3  levels on NPS. MET 
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 
 
 
 
RECOMMENDATIONS: 
 [x]Discontinue therapy: [x]Patient has reached or is progressing toward set goals []Patient is non-compliant or has abdicated 
    [x]Due to lack of appreciable progress towards set goals []Other [address] [facility], [place] 3/26/2019  
 
______________________________________________________________________ NOTE TO PHYSICIAN:  Please complete the following and fax to: [facility]: [phone] Retain this original for your records. If you are unable to process this request in 24 hours, please contact our office. [de-identified] Signature:____________________  Date:____________Time:_________

## 2019-03-29 ENCOUNTER — HOSPITAL ENCOUNTER (OUTPATIENT)
Dept: MAMMOGRAPHY | Age: 64
Discharge: HOME | End: 2019-03-29
Attending: SURGERY
Payer: MEDICAID

## 2019-03-29 DIAGNOSIS — C50.511 MALIGNANT NEOPLASM OF LOWER-OUTER QUADRANT OF RIGHT BREAST OF FEMALE, ESTROGEN RECEPTOR POSITIVE (HCC): ICD-10-CM

## 2019-03-29 DIAGNOSIS — Z17.0 MALIGNANT NEOPLASM OF LOWER-OUTER QUADRANT OF RIGHT BREAST OF FEMALE, ESTROGEN RECEPTOR POSITIVE (HCC): ICD-10-CM

## 2019-03-29 PROCEDURE — 76642 ULTRASOUND BREAST LIMITED: CPT

## 2019-03-29 PROCEDURE — 77065 DX MAMMO INCL CAD UNI: CPT

## 2019-03-30 ENCOUNTER — TELEPHONE (OUTPATIENT)
Dept: SURGERY | Age: 64
End: 2019-03-30

## 2019-03-30 DIAGNOSIS — G47.01 INSOMNIA DUE TO MEDICAL CONDITION: Primary | ICD-10-CM

## 2019-03-30 RX ORDER — ESZOPICLONE 1 MG/1
1 TABLET, FILM COATED ORAL
Qty: 15 TAB | Refills: 0 | Status: SHIPPED | OUTPATIENT
Start: 2019-03-30 | End: 2022-01-01

## 2019-03-30 NOTE — TELEPHONE ENCOUNTER
Called patient with mammogram/ultrasound report. Breast cancer diagnosed in 8/2018 has not been treated and has increased in size. Pt requested Lunexa  Prescription sent to her pharmacy. I again encouraged her to address her breast cancer and pursue appropriate treatment. She will call Monday with a decision.

## 2019-03-30 NOTE — TELEPHONE ENCOUNTER
Called patient with mammogram and ultrasound results  She has a neglected RIGHT breast cancer. It has increased in size. She asked for Lunesta which I called in but insurance denied. She is considering surgery in early May.

## 2019-04-08 ENCOUNTER — TELEPHONE (OUTPATIENT)
Dept: SURGERY | Age: 64
End: 2019-04-08

## 2019-04-08 NOTE — TELEPHONE ENCOUNTER
----- Message from Candy Horta MD sent at 4/8/2019 10:09 AM EDT -----  Regarding: RE: Lois Monaco  Not pursuing it. thanks  ----- Message -----  From: Hieu Manriquez RN  Sent: 4/8/2019   9:42 AM  To: Candy Horta MD, Ligia Pompa RN  Subject: [place] 77 is calling re: Lunesta prescription that was rejected on 3/30/19. Requesting a prior auth. Is this something you'd like me to work on? I noticed in your last note, that you were aware of the Lois Monaco being denied by insurance company. Are we forgetting about the Lunesta, or trying a different route? Hope you had a great time [place] in the [place]!     Thanks,  D

## 2019-06-11 ENCOUNTER — TELEPHONE (OUTPATIENT)
Dept: SURGERY | Age: 64
End: 2019-06-11

## 2019-06-11 DIAGNOSIS — C50.511 MALIGNANT NEOPLASM OF LOWER-OUTER QUADRANT OF RIGHT BREAST OF FEMALE, ESTROGEN RECEPTOR POSITIVE (HCC): Primary | ICD-10-CM

## 2019-06-11 DIAGNOSIS — Z17.0 MALIGNANT NEOPLASM OF LOWER-OUTER QUADRANT OF RIGHT BREAST OF FEMALE, ESTROGEN RECEPTOR POSITIVE (HCC): Primary | ICD-10-CM

## 2019-06-11 NOTE — TELEPHONE ENCOUNTER
Pt called  She had pain in her face and arm after her mammogram.  She has neck DJD    She does not want to proceed with surgery at this time. We had discussed needle localized lumpectomy but she says she would not do another mammogram..  She is ready to meet with oncology.

## 2019-06-12 NOTE — TELEPHONE ENCOUNTER
DR. Rj South ON 6-25-19 AT 4:15 P.M.; PATIENT IS TO ARRIVE 15 MINUTES EARLY    MESSAGE WAS LEFT FOR PATIENT TO CALL ME BACK SO I CAN GIVE HER THIS INFORMATION

## 2019-06-13 ENCOUNTER — DOCUMENTATION ONLY (OUTPATIENT)
Dept: SURGERY | Age: 64
End: 2019-06-13

## 2019-06-13 NOTE — PROGRESS NOTES
PATIENT RETURNED MY PHONE CALL AND RECEIVED INFORMATION FOR HER APPOINTMENT WITH DR. Serene Rincon ON 6-25-19

## 2019-06-25 ENCOUNTER — OFFICE VISIT (OUTPATIENT)
Dept: ONCOLOGY | Age: 64
End: 2019-06-25

## 2019-06-25 VITALS
BODY MASS INDEX: 22.11 KG/M2 | HEART RATE: 79 BPM | SYSTOLIC BLOOD PRESSURE: 142 MMHG | DIASTOLIC BLOOD PRESSURE: 83 MMHG | OXYGEN SATURATION: 97 % | WEIGHT: 137.6 LBS | TEMPERATURE: 97.8 F | HEIGHT: 66 IN | RESPIRATION RATE: 18 BRPM

## 2019-06-25 DIAGNOSIS — Z17.1 MALIGNANT NEOPLASM OF LOWER-OUTER QUADRANT OF RIGHT BREAST OF FEMALE, ESTROGEN RECEPTOR NEGATIVE (HCC): Primary | ICD-10-CM

## 2019-06-25 DIAGNOSIS — C50.511 MALIGNANT NEOPLASM OF LOWER-OUTER QUADRANT OF RIGHT BREAST OF FEMALE, ESTROGEN RECEPTOR NEGATIVE (HCC): Primary | ICD-10-CM

## 2019-06-25 RX ORDER — LANOLIN ALCOHOL/MO/W.PET/CERES
CREAM (GRAM) TOPICAL
COMMUNITY
End: 2022-01-01
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
## 2019-07-17 ENCOUNTER — OFFICE VISIT (OUTPATIENT)
Dept: ONCOLOGY | Age: 64
End: 2019-07-17

## 2019-07-17 VITALS
RESPIRATION RATE: 18 BRPM | BODY MASS INDEX: 21.73 KG/M2 | OXYGEN SATURATION: 98 % | HEIGHT: 66 IN | TEMPERATURE: 97.9 F | WEIGHT: 135.2 LBS | DIASTOLIC BLOOD PRESSURE: 81 MMHG | SYSTOLIC BLOOD PRESSURE: 126 MMHG | HEART RATE: 68 BPM

## 2019-07-17 DIAGNOSIS — Z17.1 MALIGNANT NEOPLASM OF LOWER-OUTER QUADRANT OF RIGHT BREAST OF FEMALE, ESTROGEN RECEPTOR NEGATIVE (HCC): Primary | ICD-10-CM

## 2019-07-17 DIAGNOSIS — C50.511 MALIGNANT NEOPLASM OF LOWER-OUTER QUADRANT OF RIGHT BREAST OF FEMALE, ESTROGEN RECEPTOR NEGATIVE (HCC): Primary | ICD-10-CM
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
## 2019-07-24 ENCOUNTER — OFFICE VISIT (OUTPATIENT)
Dept: ONCOLOGY | Age: 64
End: 2019-07-24

## 2019-07-24 VITALS
WEIGHT: 137.8 LBS | HEART RATE: 65 BPM | DIASTOLIC BLOOD PRESSURE: 57 MMHG | OXYGEN SATURATION: 97 % | BODY MASS INDEX: 22.14 KG/M2 | SYSTOLIC BLOOD PRESSURE: 108 MMHG | TEMPERATURE: 97.5 F | RESPIRATION RATE: 18 BRPM | HEIGHT: 66 IN

## 2019-07-24 DIAGNOSIS — Z17.1 MALIGNANT NEOPLASM OF LOWER-OUTER QUADRANT OF RIGHT BREAST OF FEMALE, ESTROGEN RECEPTOR NEGATIVE (HCC): Primary | ICD-10-CM

## 2019-07-24 DIAGNOSIS — C50.511 MALIGNANT NEOPLASM OF LOWER-OUTER QUADRANT OF RIGHT BREAST OF FEMALE, ESTROGEN RECEPTOR NEGATIVE (HCC): Primary | ICD-10-CM
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
## 2019-07-29 ENCOUNTER — HOSPITAL ENCOUNTER (OUTPATIENT)
Dept: NUCLEAR MEDICINE | Age: 64
End: 2019-07-29
Attending: INTERNAL MEDICINE

## 2019-07-29 ENCOUNTER — HOSPITAL ENCOUNTER (OUTPATIENT)
Dept: MRI IMAGING | Age: 64
Discharge: HOME | End: 2019-07-29
Attending: INTERNAL MEDICINE

## 2019-07-29 ENCOUNTER — HOSPITAL ENCOUNTER (OUTPATIENT)
Dept: NUCLEAR MEDICINE | Age: 64
Discharge: HOME | End: 2019-07-29
Attending: INTERNAL MEDICINE

## 2019-07-29 ENCOUNTER — HOSPITAL ENCOUNTER (OUTPATIENT)
Dept: CT IMAGING | Age: 64
Discharge: HOME | End: 2019-07-29
Attending: INTERNAL MEDICINE

## 2019-07-29 DIAGNOSIS — Z17.1 MALIGNANT NEOPLASM OF LOWER-OUTER QUADRANT OF RIGHT BREAST OF FEMALE, ESTROGEN RECEPTOR NEGATIVE (HCC): ICD-10-CM

## 2019-07-29 DIAGNOSIS — C50.511 MALIGNANT NEOPLASM OF LOWER-OUTER QUADRANT OF RIGHT BREAST OF FEMALE, ESTROGEN RECEPTOR NEGATIVE (HCC): ICD-10-CM

## 2019-07-29 NOTE — PROGRESS NOTES
Patient came to [address] [place] [address], registered but then decided that she did not want anything done per [place] of MRI.

## 2019-07-31 ENCOUNTER — OFFICE VISIT (OUTPATIENT)
Dept: ONCOLOGY | Age: 64
End: 2019-07-31

## 2019-07-31 VITALS
RESPIRATION RATE: 18 BRPM | SYSTOLIC BLOOD PRESSURE: 123 MMHG | HEART RATE: 73 BPM | TEMPERATURE: 97.1 F | HEIGHT: 66 IN | OXYGEN SATURATION: 98 % | BODY MASS INDEX: 22.21 KG/M2 | DIASTOLIC BLOOD PRESSURE: 72 MMHG | WEIGHT: 138.2 LBS

## 2019-07-31 DIAGNOSIS — C50.511 MALIGNANT NEOPLASM OF LOWER-OUTER QUADRANT OF RIGHT BREAST OF FEMALE, ESTROGEN RECEPTOR NEGATIVE (HCC): Primary | ICD-10-CM

## 2019-07-31 DIAGNOSIS — R51.9 NONINTRACTABLE HEADACHE, UNSPECIFIED CHRONICITY PATTERN, UNSPECIFIED HEADACHE TYPE: ICD-10-CM

## 2019-07-31 DIAGNOSIS — Z17.1 MALIGNANT NEOPLASM OF LOWER-OUTER QUADRANT OF RIGHT BREAST OF FEMALE, ESTROGEN RECEPTOR NEGATIVE (HCC): Primary | ICD-10-CM
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
## 2019-08-02 ENCOUNTER — TELEPHONE (OUTPATIENT)
Dept: SURGERY | Age: 64
End: 2019-08-02

## 2019-08-02 NOTE — TELEPHONE ENCOUNTER
I called patient  She was too busy to talk because she is organizing a backyard bible study. She has met with Dr Rosa Ulrich a few times. She did not get staging studies. (CT, bone scan)  She has not agreed to chemotherapy for Her2 positive disease. He encouraged her to proceed with surgery. I was calling in the hopes of scheduling her for surgery. She said her insurance has  and therefore she doesn't think she will make an appointment to see me.     She said she will call me

## 2022-01-01 ENCOUNTER — TELEPHONE (OUTPATIENT)
Dept: ONCOLOGY | Age: 67
End: 2022-01-01

## 2022-01-01 ENCOUNTER — DOCUMENTATION ONLY (OUTPATIENT)
Dept: ONCOLOGY | Age: 67
End: 2022-01-01

## 2022-01-01 ENCOUNTER — HOSPITAL ENCOUNTER (OUTPATIENT)
Dept: CT IMAGING | Age: 67
Discharge: HOME | End: 2022-02-18
Attending: INTERNAL MEDICINE
Payer: MEDICARE

## 2022-01-01 ENCOUNTER — APPOINTMENT (OUTPATIENT)
Dept: GENERAL RADIOLOGY | Age: 67
DRG: 640 | End: 2022-01-01
Attending: INTERNAL MEDICINE
Payer: MEDICARE

## 2022-01-01 ENCOUNTER — NURSE NAVIGATOR (OUTPATIENT)
Dept: CASE MANAGEMENT | Age: 67
End: 2022-01-01

## 2022-01-01 ENCOUNTER — HOSPITAL ENCOUNTER (OUTPATIENT)
Dept: INFUSION THERAPY | Age: 67
Discharge: HOME | End: 2022-02-16
Payer: MEDICARE

## 2022-01-01 ENCOUNTER — HOSPITAL ENCOUNTER (INPATIENT)
Age: 67
LOS: 4 days | Discharge: HOSPICE | DRG: 640 | End: 2022-03-06
Attending: EMERGENCY MEDICINE | Admitting: INTERNAL MEDICINE
Payer: MEDICARE

## 2022-01-01 ENCOUNTER — ANCILLARY PROCEDURE (OUTPATIENT)
Dept: CARDIOLOGY CLINIC | Age: 67
End: 2022-01-01
Payer: MEDICARE

## 2022-01-01 ENCOUNTER — OFFICE VISIT (OUTPATIENT)
Dept: ONCOLOGY | Age: 67
End: 2022-01-01
Payer: MEDICARE

## 2022-01-01 ENCOUNTER — HOSPITAL ENCOUNTER (OUTPATIENT)
Dept: NUCLEAR MEDICINE | Age: 67
Discharge: HOME | End: 2022-02-22
Attending: INTERNAL MEDICINE
Payer: MEDICARE

## 2022-01-01 ENCOUNTER — HOSPITAL ENCOUNTER (OUTPATIENT)
Dept: VASCULAR SURGERY | Age: 67
Discharge: HOME | End: 2022-02-25
Attending: INTERNAL MEDICINE
Payer: MEDICARE

## 2022-01-01 ENCOUNTER — HOSPICE ADMISSION (OUTPATIENT)
Dept: HOSPICE | Facility: HOSPICE | Age: 67
End: 2022-01-01
Payer: MEDICARE

## 2022-01-01 ENCOUNTER — HOSPITAL ENCOUNTER (OUTPATIENT)
Dept: INFUSION THERAPY | Age: 67
Discharge: HOME | End: 2022-03-02
Payer: MEDICARE

## 2022-01-01 ENCOUNTER — HOSPITAL ENCOUNTER (INPATIENT)
Age: 67
LOS: 2 days | End: 2022-03-08
Attending: FAMILY MEDICINE | Admitting: INTERNAL MEDICINE
Payer: MEDICARE

## 2022-01-01 ENCOUNTER — APPOINTMENT (OUTPATIENT)
Dept: GENERAL RADIOLOGY | Age: 67
DRG: 640 | End: 2022-01-01
Attending: EMERGENCY MEDICINE
Payer: MEDICARE

## 2022-01-01 VITALS
SYSTOLIC BLOOD PRESSURE: 146 MMHG | HEART RATE: 106 BPM | OXYGEN SATURATION: 99 % | RESPIRATION RATE: 18 BRPM | DIASTOLIC BLOOD PRESSURE: 71 MMHG | HEIGHT: 66 IN | BODY MASS INDEX: 22.31 KG/M2 | TEMPERATURE: 98 F

## 2022-01-01 VITALS
TEMPERATURE: 97.6 F | BODY MASS INDEX: 21.6 KG/M2 | HEIGHT: 66 IN | SYSTOLIC BLOOD PRESSURE: 133 MMHG | WEIGHT: 134.4 LBS | OXYGEN SATURATION: 93 % | RESPIRATION RATE: 26 BRPM | HEART RATE: 105 BPM | DIASTOLIC BLOOD PRESSURE: 74 MMHG

## 2022-01-01 VITALS
SYSTOLIC BLOOD PRESSURE: 145 MMHG | BODY MASS INDEX: 22.27 KG/M2 | HEIGHT: 66 IN | HEART RATE: 105 BPM | TEMPERATURE: 98.7 F | OXYGEN SATURATION: 92 % | RESPIRATION RATE: 20 BRPM | DIASTOLIC BLOOD PRESSURE: 84 MMHG

## 2022-01-01 VITALS
RESPIRATION RATE: 18 BRPM | TEMPERATURE: 98 F | OXYGEN SATURATION: 95 % | BODY MASS INDEX: 22.31 KG/M2 | DIASTOLIC BLOOD PRESSURE: 80 MMHG | SYSTOLIC BLOOD PRESSURE: 147 MMHG | HEART RATE: 101 BPM | HEIGHT: 66 IN

## 2022-01-01 VITALS
TEMPERATURE: 99 F | RESPIRATION RATE: 18 BRPM | BODY MASS INDEX: 21.69 KG/M2 | DIASTOLIC BLOOD PRESSURE: 82 MMHG | WEIGHT: 135 LBS | OXYGEN SATURATION: 92 % | HEIGHT: 66 IN | SYSTOLIC BLOOD PRESSURE: 134 MMHG | HEART RATE: 107 BPM

## 2022-01-01 VITALS
OXYGEN SATURATION: 96 % | BODY MASS INDEX: 21.6 KG/M2 | RESPIRATION RATE: 10 BRPM | SYSTOLIC BLOOD PRESSURE: 66 MMHG | HEART RATE: 84 BPM | TEMPERATURE: 97.8 F | HEIGHT: 66 IN | WEIGHT: 134.4 LBS | DIASTOLIC BLOOD PRESSURE: 38 MMHG

## 2022-01-01 VITALS
WEIGHT: 138 LBS | SYSTOLIC BLOOD PRESSURE: 154 MMHG | DIASTOLIC BLOOD PRESSURE: 72 MMHG | HEIGHT: 66 IN | BODY MASS INDEX: 22.18 KG/M2

## 2022-01-01 DIAGNOSIS — Z17.1 MALIGNANT NEOPLASM OF LOWER-OUTER QUADRANT OF RIGHT BREAST OF FEMALE, ESTROGEN RECEPTOR NEGATIVE (HCC): Primary | ICD-10-CM

## 2022-01-01 DIAGNOSIS — Z51.5 HOSPICE CARE: ICD-10-CM

## 2022-01-01 DIAGNOSIS — G89.3 CANCER ASSOCIATED PAIN: ICD-10-CM

## 2022-01-01 DIAGNOSIS — Z79.899 ENCOUNTER FOR MONITORING CARDIOTOXIC DRUG THERAPY: ICD-10-CM

## 2022-01-01 DIAGNOSIS — C50.919 METASTATIC BREAST CANCER (HCC): Primary | ICD-10-CM

## 2022-01-01 DIAGNOSIS — C50.919 METASTATIC BREAST CANCER (HCC): ICD-10-CM

## 2022-01-01 DIAGNOSIS — R52 PAIN: ICD-10-CM

## 2022-01-01 DIAGNOSIS — R53.1 WEAKNESS: ICD-10-CM

## 2022-01-01 DIAGNOSIS — C50.511 MALIGNANT NEOPLASM OF LOWER-OUTER QUADRANT OF RIGHT BREAST OF FEMALE, ESTROGEN RECEPTOR NEGATIVE (HCC): Primary | ICD-10-CM

## 2022-01-01 DIAGNOSIS — C50.511 MALIGNANT NEOPLASM OF LOWER-OUTER QUADRANT OF RIGHT BREAST OF FEMALE, ESTROGEN RECEPTOR NEGATIVE (HCC): ICD-10-CM

## 2022-01-01 DIAGNOSIS — Z71.89 GOALS OF CARE, COUNSELING/DISCUSSION: ICD-10-CM

## 2022-01-01 DIAGNOSIS — Z51.81 ENCOUNTER FOR MONITORING CARDIOTOXIC DRUG THERAPY: ICD-10-CM

## 2022-01-01 DIAGNOSIS — Z17.1 MALIGNANT NEOPLASM OF LOWER-OUTER QUADRANT OF RIGHT BREAST OF FEMALE, ESTROGEN RECEPTOR NEGATIVE (HCC): ICD-10-CM

## 2022-01-01 DIAGNOSIS — Z51.5 PALLIATIVE CARE ENCOUNTER: ICD-10-CM

## 2022-01-01 DIAGNOSIS — K59.00 CONSTIPATION, UNSPECIFIED CONSTIPATION TYPE: ICD-10-CM

## 2022-01-01 DIAGNOSIS — R45.1 RESTLESSNESS: ICD-10-CM

## 2022-01-01 DIAGNOSIS — F05 DELIRIUM DUE TO MULTIPLE ETIOLOGIES: ICD-10-CM

## 2022-01-01 DIAGNOSIS — Z71.89 DNR (DO NOT RESUSCITATE) DISCUSSION: ICD-10-CM

## 2022-01-01 DIAGNOSIS — Z71.89 ADVANCED CARE PLANNING/COUNSELING DISCUSSION: ICD-10-CM

## 2022-01-01 DIAGNOSIS — R41.82 ALTERED MENTAL STATUS, UNSPECIFIED ALTERED MENTAL STATUS TYPE: ICD-10-CM

## 2022-01-01 DIAGNOSIS — E83.52 HYPERCALCEMIA: Primary | ICD-10-CM

## 2022-01-01 LAB
ALBUMIN SERPL-MCNC: 1.9 G/DL (ref 3.5–5)
ALBUMIN SERPL-MCNC: 1.9 G/DL (ref 3.5–5)
ALBUMIN SERPL-MCNC: 2.4 G/DL (ref 3.5–5)
ALBUMIN SERPL-MCNC: 2.7 G/DL (ref 3.5–5)
ALBUMIN/GLOB SERPL: 0.6 {RATIO} (ref 1.1–2.2)
ALBUMIN/GLOB SERPL: 0.8 {RATIO} (ref 1.1–2.2)
ALP SERPL-CCNC: 318 U/L (ref 45–117)
ALP SERPL-CCNC: 398 U/L (ref 45–117)
ALP SERPL-CCNC: 452 U/L (ref 45–117)
ALP SERPL-CCNC: 483 U/L (ref 45–117)
ALT SERPL-CCNC: 106 U/L (ref 12–78)
ALT SERPL-CCNC: 156 U/L (ref 12–78)
ALT SERPL-CCNC: 169 U/L (ref 12–78)
ALT SERPL-CCNC: 82 U/L (ref 12–78)
ANION GAP SERPL CALC-SCNC: 10 MMOL/L (ref 5–15)
ANION GAP SERPL CALC-SCNC: 7 MMOL/L (ref 5–15)
ANION GAP SERPL CALC-SCNC: 8 MMOL/L (ref 5–15)
ANION GAP SERPL CALC-SCNC: 8 MMOL/L (ref 5–15)
ANION GAP SERPL CALC-SCNC: 9 MMOL/L (ref 5–15)
ANION GAP SERPL CALC-SCNC: 9 MMOL/L (ref 5–15)
APPEARANCE UR: CLEAR
APPEARANCE UR: CLEAR
AST SERPL-CCNC: 135 U/L (ref 15–37)
AST SERPL-CCNC: 310 U/L (ref 15–37)
AST SERPL-CCNC: 754 U/L (ref 15–37)
AST SERPL-CCNC: 811 U/L (ref 15–37)
ATRIAL RATE: 118 BPM
ATRIAL RATE: 97 BPM
ATRIAL RATE: 97 BPM
BACTERIA SPEC CULT: NORMAL
BACTERIA SPEC CULT: NORMAL
BACTERIA URNS QL MICRO: NEGATIVE /HPF
BACTERIA URNS QL MICRO: NEGATIVE /HPF
BASOPHILS # BLD: 0 K/UL (ref 0–0.1)
BASOPHILS NFR BLD: 0 % (ref 0–1)
BILIRUB SERPL-MCNC: 0.6 MG/DL (ref 0.2–1)
BILIRUB SERPL-MCNC: 0.7 MG/DL (ref 0.2–1)
BILIRUB SERPL-MCNC: 0.8 MG/DL (ref 0.2–1)
BILIRUB SERPL-MCNC: 1.1 MG/DL (ref 0.2–1)
BILIRUB UR QL: NEGATIVE
BILIRUB UR QL: NEGATIVE
BNP SERPL-MCNC: 1009 PG/ML
BUN SERPL-MCNC: 10 MG/DL (ref 6–20)
BUN SERPL-MCNC: 17 MG/DL (ref 6–20)
BUN SERPL-MCNC: 17 MG/DL (ref 6–20)
BUN SERPL-MCNC: 18 MG/DL (ref 6–20)
BUN SERPL-MCNC: 19 MG/DL (ref 6–20)
BUN SERPL-MCNC: 8 MG/DL (ref 6–20)
BUN/CREAT SERPL: 24 (ref 12–20)
BUN/CREAT SERPL: 26 (ref 12–20)
BUN/CREAT SERPL: 28 (ref 12–20)
BUN/CREAT SERPL: 29 (ref 12–20)
BUN/CREAT SERPL: 34 (ref 12–20)
BUN/CREAT SERPL: 41 (ref 12–20)
CALCIUM SERPL-MCNC: 12.7 MG/DL (ref 8.5–10.1)
CALCIUM SERPL-MCNC: 13.4 MG/DL (ref 8.5–10.1)
CALCIUM SERPL-MCNC: 8 MG/DL (ref 8.5–10.1)
CALCIUM SERPL-MCNC: 8.4 MG/DL (ref 8.5–10.1)
CALCIUM SERPL-MCNC: 8.8 MG/DL (ref 8.5–10.1)
CALCIUM SERPL-MCNC: 9.5 MG/DL (ref 8.5–10.1)
CALCULATED P AXIS, ECG09: 38 DEGREES
CALCULATED P AXIS, ECG09: 65 DEGREES
CALCULATED P AXIS, ECG09: 66 DEGREES
CALCULATED R AXIS, ECG10: 43 DEGREES
CALCULATED R AXIS, ECG10: 66 DEGREES
CALCULATED R AXIS, ECG10: 79 DEGREES
CALCULATED T AXIS, ECG11: 60 DEGREES
CALCULATED T AXIS, ECG11: 62 DEGREES
CALCULATED T AXIS, ECG11: 76 DEGREES
CAOX CRY URNS QL MICRO: ABNORMAL
CHLORIDE SERPL-SCNC: 101 MMOL/L (ref 97–108)
CHLORIDE SERPL-SCNC: 103 MMOL/L (ref 97–108)
CHLORIDE SERPL-SCNC: 103 MMOL/L (ref 97–108)
CHLORIDE SERPL-SCNC: 105 MMOL/L (ref 97–108)
CHLORIDE SERPL-SCNC: 106 MMOL/L (ref 97–108)
CHLORIDE SERPL-SCNC: 97 MMOL/L (ref 97–108)
CO2 SERPL-SCNC: 23 MMOL/L (ref 21–32)
CO2 SERPL-SCNC: 23 MMOL/L (ref 21–32)
CO2 SERPL-SCNC: 25 MMOL/L (ref 21–32)
CO2 SERPL-SCNC: 25 MMOL/L (ref 21–32)
CO2 SERPL-SCNC: 26 MMOL/L (ref 21–32)
CO2 SERPL-SCNC: 27 MMOL/L (ref 21–32)
COLOR UR: ABNORMAL
COLOR UR: ABNORMAL
COMMENT, HOLDF: NORMAL
COMMENT, HOLDF: NORMAL
COVID-19 RAPID TEST, COVR: NOT DETECTED
CREAT BLD-MCNC: 0.7 MG/DL (ref 0.6–1.3)
CREAT SERPL-MCNC: 0.34 MG/DL (ref 0.55–1.02)
CREAT SERPL-MCNC: 0.35 MG/DL (ref 0.55–1.02)
CREAT SERPL-MCNC: 0.46 MG/DL (ref 0.55–1.02)
CREAT SERPL-MCNC: 0.5 MG/DL (ref 0.55–1.02)
CREAT SERPL-MCNC: 0.6 MG/DL (ref 0.55–1.02)
CREAT SERPL-MCNC: 0.69 MG/DL (ref 0.55–1.02)
DIAGNOSIS, 93000: NORMAL
DIFFERENTIAL METHOD BLD: ABNORMAL
ECHO AO ASC DIAM: 2.8 CM
ECHO AO ASCENDING AORTA INDEX: 1.64 CM/M2
ECHO AO ROOT DIAM: 3 CM
ECHO AO ROOT INDEX: 1.75 CM/M2
ECHO AV AREA PEAK VELOCITY: 2.8 CM2
ECHO AV AREA VTI: 2.8 CM2
ECHO AV AREA/BSA PEAK VELOCITY: 1.6 CM2/M2
ECHO AV AREA/BSA VTI: 1.6 CM2/M2
ECHO AV MEAN GRADIENT: 3 MMHG
ECHO AV MEAN VELOCITY: 0.9 M/S
ECHO AV PEAK GRADIENT: 7 MMHG
ECHO AV PEAK VELOCITY: 1.3 M/S
ECHO AV VELOCITY RATIO: 0.77
ECHO AV VTI: 23.8 CM
ECHO EST RA PRESSURE: 3 MMHG
ECHO LA DIAMETER INDEX: 1.75 CM/M2
ECHO LA DIAMETER: 3 CM
ECHO LA TO AORTIC ROOT RATIO: 1
ECHO LA VOL 2C: 36 ML (ref 22–52)
ECHO LA VOL 4C: 23 ML (ref 22–52)
ECHO LA VOL BP: 31 ML (ref 22–52)
ECHO LA VOL/BSA BIPLANE: 18 ML/M2 (ref 16–34)
ECHO LA VOLUME AREA LENGTH: 35 ML
ECHO LA VOLUME INDEX A2C: 21 ML/M2 (ref 16–34)
ECHO LA VOLUME INDEX A4C: 13 ML/M2 (ref 16–34)
ECHO LA VOLUME INDEX AREA LENGTH: 20 ML/M2 (ref 16–34)
ECHO LV E' LATERAL VELOCITY: 9 CM/S
ECHO LV E' SEPTAL VELOCITY: 5 CM/S
ECHO LV EDV A2C: 40 ML
ECHO LV EDV A4C: 45 ML
ECHO LV EDV BP: 44 ML (ref 56–104)
ECHO LV EDV INDEX A4C: 26 ML/M2
ECHO LV EDV INDEX BP: 26 ML/M2
ECHO LV EDV NDEX A2C: 23 ML/M2
ECHO LV EJECTION FRACTION A2C: 61 %
ECHO LV EJECTION FRACTION A4C: 65 %
ECHO LV EJECTION FRACTION BIPLANE: 64 % (ref 55–100)
ECHO LV ESV A2C: 16 ML
ECHO LV ESV A4C: 16 ML
ECHO LV ESV BP: 16 ML (ref 19–49)
ECHO LV ESV INDEX A2C: 9 ML/M2
ECHO LV ESV INDEX A4C: 9 ML/M2
ECHO LV ESV INDEX BP: 9 ML/M2
ECHO LV FRACTIONAL SHORTENING: 41 % (ref 28–44)
ECHO LV GLOBAL LONGITUDINAL STRAIN (GLS): -20.2 %
ECHO LV INTERNAL DIMENSION DIASTOLE INDEX: 2.57 CM/M2
ECHO LV INTERNAL DIMENSION DIASTOLIC: 4.4 CM (ref 3.9–5.3)
ECHO LV INTERNAL DIMENSION SYSTOLIC INDEX: 1.52 CM/M2
ECHO LV INTERNAL DIMENSION SYSTOLIC: 2.6 CM
ECHO LV IVSD: 0.9 CM (ref 0.6–0.9)
ECHO LV MASS 2D: 109.4 G (ref 67–162)
ECHO LV MASS INDEX 2D: 64 G/M2 (ref 43–95)
ECHO LV POSTERIOR WALL DIASTOLIC: 0.7 CM (ref 0.6–0.9)
ECHO LV RELATIVE WALL THICKNESS RATIO: 0.32
ECHO LVOT AREA: 3.5 CM2
ECHO LVOT AV VTI INDEX: 0.8
ECHO LVOT DIAM: 2.1 CM
ECHO LVOT MEAN GRADIENT: 2 MMHG
ECHO LVOT PEAK GRADIENT: 4 MMHG
ECHO LVOT PEAK VELOCITY: 1 M/S
ECHO LVOT STROKE VOLUME INDEX: 38.5 ML/M2
ECHO LVOT SV: 65.8 ML
ECHO LVOT VTI: 19 CM
ECHO MV A VELOCITY: 0.75 M/S
ECHO MV AREA PHT: 5.5 CM2
ECHO MV E DECELERATION TIME (DT): 137.2 MS
ECHO MV E VELOCITY: 0.62 M/S
ECHO MV E/A RATIO: 0.83
ECHO MV E/E' LATERAL: 6.89
ECHO MV E/E' RATIO (AVERAGED): 9.64
ECHO MV E/E' SEPTAL: 12.4
ECHO MV PRESSURE HALF TIME (PHT): 39.8 MS
ECHO RIGHT VENTRICULAR SYSTOLIC PRESSURE (RVSP): 39 MMHG
ECHO RV FREE WALL PEAK S': 11 CM/S
ECHO RV INTERNAL DIMENSION: 2.8 CM
ECHO RV TAPSE: 1.7 CM (ref 1.5–2)
ECHO TV REGURGITANT MAX VELOCITY: 3.01 M/S
ECHO TV REGURGITANT PEAK GRADIENT: 36 MMHG
EOSINOPHIL # BLD: 0 K/UL (ref 0–0.4)
EOSINOPHIL # BLD: 0 K/UL (ref 0–0.4)
EOSINOPHIL # BLD: 0.1 K/UL (ref 0–0.4)
EOSINOPHIL # BLD: 0.2 K/UL (ref 0–0.4)
EOSINOPHIL NFR BLD: 0 % (ref 0–7)
EOSINOPHIL NFR BLD: 0 % (ref 0–7)
EOSINOPHIL NFR BLD: 1 % (ref 0–7)
EOSINOPHIL NFR BLD: 2 % (ref 0–7)
EPITH CASTS URNS QL MICRO: ABNORMAL /LPF
EPITH CASTS URNS QL MICRO: ABNORMAL /LPF
ERYTHROCYTE [DISTWIDTH] IN BLOOD BY AUTOMATED COUNT: 15.1 % (ref 11.5–14.5)
ERYTHROCYTE [DISTWIDTH] IN BLOOD BY AUTOMATED COUNT: 15.2 % (ref 11.5–14.5)
ERYTHROCYTE [DISTWIDTH] IN BLOOD BY AUTOMATED COUNT: 15.3 % (ref 11.5–14.5)
ERYTHROCYTE [DISTWIDTH] IN BLOOD BY AUTOMATED COUNT: 15.4 % (ref 11.5–14.5)
ERYTHROCYTE [DISTWIDTH] IN BLOOD BY AUTOMATED COUNT: 15.5 % (ref 11.5–14.5)
ERYTHROCYTE [DISTWIDTH] IN BLOOD BY AUTOMATED COUNT: 15.5 % (ref 11.5–14.5)
GLOBULIN SER CALC-MCNC: 3.1 G/DL (ref 2–4)
GLOBULIN SER CALC-MCNC: 3.4 G/DL (ref 2–4)
GLOBULIN SER CALC-MCNC: 3.5 G/DL (ref 2–4)
GLOBULIN SER CALC-MCNC: 3.7 G/DL (ref 2–4)
GLUCOSE SERPL-MCNC: 118 MG/DL (ref 65–100)
GLUCOSE SERPL-MCNC: 70 MG/DL (ref 65–100)
GLUCOSE SERPL-MCNC: 72 MG/DL (ref 65–100)
GLUCOSE SERPL-MCNC: 75 MG/DL (ref 65–100)
GLUCOSE SERPL-MCNC: 76 MG/DL (ref 65–100)
GLUCOSE SERPL-MCNC: 90 MG/DL (ref 65–100)
GLUCOSE UR STRIP.AUTO-MCNC: NEGATIVE MG/DL
GLUCOSE UR STRIP.AUTO-MCNC: NEGATIVE MG/DL
HBV CORE AB SERPL QL IA: NEGATIVE
HBV SURFACE AB SER QL: NONREACTIVE
HBV SURFACE AB SER-ACNC: <3.1 MIU/ML
HBV SURFACE AG SER QL: <0.1 INDEX
HBV SURFACE AG SER QL: NEGATIVE
HCT VFR BLD AUTO: 32.9 % (ref 35–47)
HCT VFR BLD AUTO: 32.9 % (ref 35–47)
HCT VFR BLD AUTO: 33.8 % (ref 35–47)
HCT VFR BLD AUTO: 34.2 % (ref 35–47)
HCT VFR BLD AUTO: 36.9 % (ref 35–47)
HCT VFR BLD AUTO: 39 % (ref 35–47)
HGB BLD-MCNC: 10.1 G/DL (ref 11.5–16)
HGB BLD-MCNC: 10.2 G/DL (ref 11.5–16)
HGB BLD-MCNC: 10.4 G/DL (ref 11.5–16)
HGB BLD-MCNC: 10.7 G/DL (ref 11.5–16)
HGB BLD-MCNC: 11.8 G/DL (ref 11.5–16)
HGB BLD-MCNC: 12.3 G/DL (ref 11.5–16)
HGB UR QL STRIP: NEGATIVE
HGB UR QL STRIP: NEGATIVE
HYALINE CASTS URNS QL MICRO: ABNORMAL /LPF (ref 0–2)
HYALINE CASTS URNS QL MICRO: ABNORMAL /LPF (ref 0–5)
IMM GRANULOCYTES # BLD AUTO: 0.1 K/UL (ref 0–0.04)
IMM GRANULOCYTES NFR BLD AUTO: 1 % (ref 0–0.5)
KETONES UR QL STRIP.AUTO: 15 MG/DL
KETONES UR QL STRIP.AUTO: ABNORMAL MG/DL
LACTATE SERPL-SCNC: 1.6 MMOL/L (ref 0.4–2)
LACTATE SERPL-SCNC: 1.8 MMOL/L (ref 0.4–2)
LACTATE SERPL-SCNC: 2.8 MMOL/L (ref 0.4–2)
LEUKOCYTE ESTERASE UR QL STRIP.AUTO: ABNORMAL
LEUKOCYTE ESTERASE UR QL STRIP.AUTO: NEGATIVE
LYMPHOCYTES # BLD: 0.2 K/UL (ref 0.8–3.5)
LYMPHOCYTES # BLD: 0.3 K/UL (ref 0.8–3.5)
LYMPHOCYTES # BLD: 0.6 K/UL (ref 0.8–3.5)
LYMPHOCYTES # BLD: 0.6 K/UL (ref 0.8–3.5)
LYMPHOCYTES # BLD: 0.7 K/UL (ref 0.8–3.5)
LYMPHOCYTES # BLD: 0.7 K/UL (ref 0.8–3.5)
LYMPHOCYTES NFR BLD: 3 % (ref 12–49)
LYMPHOCYTES NFR BLD: 4 % (ref 12–49)
LYMPHOCYTES NFR BLD: 7 % (ref 12–49)
LYMPHOCYTES NFR BLD: 8 % (ref 12–49)
MAGNESIUM SERPL-MCNC: 1.8 MG/DL (ref 1.6–2.4)
MAGNESIUM SERPL-MCNC: 2 MG/DL (ref 1.6–2.4)
MCH RBC QN AUTO: 25.7 PG (ref 26–34)
MCH RBC QN AUTO: 25.9 PG (ref 26–34)
MCH RBC QN AUTO: 26 PG (ref 26–34)
MCH RBC QN AUTO: 26.2 PG (ref 26–34)
MCHC RBC AUTO-ENTMCNC: 30.7 G/DL (ref 30–36.5)
MCHC RBC AUTO-ENTMCNC: 30.8 G/DL (ref 30–36.5)
MCHC RBC AUTO-ENTMCNC: 31 G/DL (ref 30–36.5)
MCHC RBC AUTO-ENTMCNC: 31.3 G/DL (ref 30–36.5)
MCHC RBC AUTO-ENTMCNC: 31.5 G/DL (ref 30–36.5)
MCHC RBC AUTO-ENTMCNC: 32 G/DL (ref 30–36.5)
MCV RBC AUTO: 80.9 FL (ref 80–99)
MCV RBC AUTO: 83 FL (ref 80–99)
MCV RBC AUTO: 83.5 FL (ref 80–99)
MCV RBC AUTO: 83.6 FL (ref 80–99)
MCV RBC AUTO: 84.4 FL (ref 80–99)
MCV RBC AUTO: 84.6 FL (ref 80–99)
MONOCYTES # BLD: 0.2 K/UL (ref 0–1)
MONOCYTES # BLD: 0.3 K/UL (ref 0–1)
MONOCYTES # BLD: 0.3 K/UL (ref 0–1)
MONOCYTES # BLD: 0.5 K/UL (ref 0–1)
MONOCYTES # BLD: 0.7 K/UL (ref 0–1)
MONOCYTES # BLD: 0.8 K/UL (ref 0–1)
MONOCYTES NFR BLD: 3 % (ref 5–13)
MONOCYTES NFR BLD: 3 % (ref 5–13)
MONOCYTES NFR BLD: 4 % (ref 5–13)
MONOCYTES NFR BLD: 6 % (ref 5–13)
MONOCYTES NFR BLD: 8 % (ref 5–13)
MONOCYTES NFR BLD: 9 % (ref 5–13)
NEUTS SEG # BLD: 5.5 K/UL (ref 1.8–8)
NEUTS SEG # BLD: 6.8 K/UL (ref 1.8–8)
NEUTS SEG # BLD: 7.1 K/UL (ref 1.8–8)
NEUTS SEG # BLD: 7.4 K/UL (ref 1.8–8)
NEUTS SEG # BLD: 7.5 K/UL (ref 1.8–8)
NEUTS SEG # BLD: 7.8 K/UL (ref 1.8–8)
NEUTS SEG NFR BLD: 81 % (ref 32–75)
NEUTS SEG NFR BLD: 84 % (ref 32–75)
NEUTS SEG NFR BLD: 85 % (ref 32–75)
NEUTS SEG NFR BLD: 86 % (ref 32–75)
NEUTS SEG NFR BLD: 92 % (ref 32–75)
NEUTS SEG NFR BLD: 92 % (ref 32–75)
NITRITE UR QL STRIP.AUTO: NEGATIVE
NITRITE UR QL STRIP.AUTO: NEGATIVE
NRBC # BLD: 0 K/UL (ref 0–0.01)
NRBC # BLD: 0.02 K/UL (ref 0–0.01)
NRBC BLD-RTO: 0 PER 100 WBC
NRBC BLD-RTO: 0.2 PER 100 WBC
P-R INTERVAL, ECG05: 114 MS
P-R INTERVAL, ECG05: 120 MS
P-R INTERVAL, ECG05: 120 MS
PH UR STRIP: 5 [PH] (ref 5–8)
PH UR STRIP: 5 [PH] (ref 5–8)
PHOSPHATE SERPL-MCNC: 1.3 MG/DL (ref 2.6–4.7)
PHOSPHATE SERPL-MCNC: 2.1 MG/DL (ref 2.6–4.7)
PHOSPHATE SERPL-MCNC: 2.6 MG/DL (ref 2.6–4.7)
PHOSPHATE SERPL-MCNC: 2.8 MG/DL (ref 2.6–4.7)
PLATELET # BLD AUTO: 122 K/UL (ref 150–400)
PLATELET # BLD AUTO: 135 K/UL (ref 150–400)
PLATELET # BLD AUTO: 147 K/UL (ref 150–400)
PLATELET # BLD AUTO: 151 K/UL (ref 150–400)
PLATELET # BLD AUTO: 171 K/UL (ref 150–400)
PLATELET # BLD AUTO: 201 K/UL (ref 150–400)
PMV BLD AUTO: 10.6 FL (ref 8.9–12.9)
PMV BLD AUTO: 10.7 FL (ref 8.9–12.9)
PMV BLD AUTO: 10.8 FL (ref 8.9–12.9)
PMV BLD AUTO: 10.9 FL (ref 8.9–12.9)
PMV BLD AUTO: 11.1 FL (ref 8.9–12.9)
PMV BLD AUTO: 11.3 FL (ref 8.9–12.9)
POTASSIUM SERPL-SCNC: 3.4 MMOL/L (ref 3.5–5.1)
POTASSIUM SERPL-SCNC: 3.6 MMOL/L (ref 3.5–5.1)
POTASSIUM SERPL-SCNC: 3.6 MMOL/L (ref 3.5–5.1)
POTASSIUM SERPL-SCNC: 3.9 MMOL/L (ref 3.5–5.1)
POTASSIUM SERPL-SCNC: 3.9 MMOL/L (ref 3.5–5.1)
POTASSIUM SERPL-SCNC: 4.2 MMOL/L (ref 3.5–5.1)
PROT SERPL-MCNC: 5 G/DL (ref 6.4–8.2)
PROT SERPL-MCNC: 5.3 G/DL (ref 6.4–8.2)
PROT SERPL-MCNC: 6.1 G/DL (ref 6.4–8.2)
PROT SERPL-MCNC: 6.2 G/DL (ref 6.4–8.2)
PROT UR STRIP-MCNC: NEGATIVE MG/DL
PROT UR STRIP-MCNC: NEGATIVE MG/DL
Q-T INTERVAL, ECG07: 280 MS
Q-T INTERVAL, ECG07: 328 MS
Q-T INTERVAL, ECG07: 332 MS
QRS DURATION, ECG06: 102 MS
QRS DURATION, ECG06: 78 MS
QRS DURATION, ECG06: 80 MS
QTC CALCULATION (BEZET), ECG08: 392 MS
QTC CALCULATION (BEZET), ECG08: 416 MS
QTC CALCULATION (BEZET), ECG08: 421 MS
RBC # BLD AUTO: 3.89 M/UL (ref 3.8–5.2)
RBC # BLD AUTO: 3.9 M/UL (ref 3.8–5.2)
RBC # BLD AUTO: 4.05 M/UL (ref 3.8–5.2)
RBC # BLD AUTO: 4.09 M/UL (ref 3.8–5.2)
RBC # BLD AUTO: 4.56 M/UL (ref 3.8–5.2)
RBC # BLD AUTO: 4.7 M/UL (ref 3.8–5.2)
RBC #/AREA URNS HPF: ABNORMAL /HPF (ref 0–5)
RBC #/AREA URNS HPF: ABNORMAL /HPF (ref 0–5)
RBC MORPH BLD: ABNORMAL
SAMPLES BEING HELD,HOLD: NORMAL
SAMPLES BEING HELD,HOLD: NORMAL
SERVICE CMNT-IMP: NORMAL
SERVICE CMNT-IMP: NORMAL
SODIUM SERPL-SCNC: 134 MMOL/L (ref 136–145)
SODIUM SERPL-SCNC: 135 MMOL/L (ref 136–145)
SODIUM SERPL-SCNC: 136 MMOL/L (ref 136–145)
SODIUM SERPL-SCNC: 138 MMOL/L (ref 136–145)
SOURCE, COVRS: NORMAL
SP GR UR REFRACTOMETRY: 1.01 (ref 1–1.03)
SP GR UR REFRACTOMETRY: 1.02 (ref 1–1.03)
TROPONIN-HIGH SENSITIVITY: 26 NG/L (ref 0–51)
UA: UC IF INDICATED,UAUC: ABNORMAL
UR CULT HOLD, URHOLD: NORMAL
UROBILINOGEN UR QL STRIP.AUTO: 0.2 EU/DL (ref 0.2–1)
UROBILINOGEN UR QL STRIP.AUTO: 1 EU/DL (ref 0.2–1)
VENTRICULAR RATE, ECG03: 118 BPM
VENTRICULAR RATE, ECG03: 97 BPM
VENTRICULAR RATE, ECG03: 97 BPM
WBC # BLD AUTO: 6 K/UL (ref 3.6–11)
WBC # BLD AUTO: 8 K/UL (ref 3.6–11)
WBC # BLD AUTO: 8.3 K/UL (ref 3.6–11)
WBC # BLD AUTO: 8.7 K/UL (ref 3.6–11)
WBC # BLD AUTO: 8.7 K/UL (ref 3.6–11)
WBC # BLD AUTO: 9.3 K/UL (ref 3.6–11)
WBC URNS QL MICRO: ABNORMAL /HPF (ref 0–4)
WBC URNS QL MICRO: ABNORMAL /HPF (ref 0–4)

## 2022-01-01 PROCEDURE — 99214 OFFICE O/P EST MOD 30 MIN: CPT | Performed by: INTERNAL MEDICINE

## 2022-01-01 PROCEDURE — 74011250637 HC RX REV CODE- 250/637: Performed by: INTERNAL MEDICINE

## 2022-01-01 PROCEDURE — 74011250636 HC RX REV CODE- 250/636: Performed by: INTERNAL MEDICINE

## 2022-01-01 PROCEDURE — 74011000250 HC RX REV CODE- 250: Performed by: NURSE PRACTITIONER

## 2022-01-01 PROCEDURE — 80053 COMPREHEN METABOLIC PANEL: CPT

## 2022-01-01 PROCEDURE — 3017F COLORECTAL CA SCREEN DOC REV: CPT | Performed by: INTERNAL MEDICINE

## 2022-01-01 PROCEDURE — 99285 EMERGENCY DEPT VISIT HI MDM: CPT

## 2022-01-01 PROCEDURE — 94761 N-INVAS EAR/PLS OXIMETRY MLT: CPT

## 2022-01-01 PROCEDURE — 96375 TX/PRO/DX INJ NEW DRUG ADDON: CPT

## 2022-01-01 PROCEDURE — G8400 PT W/DXA NO RESULTS DOC: HCPCS | Performed by: INTERNAL MEDICINE

## 2022-01-01 PROCEDURE — 93005 ELECTROCARDIOGRAM TRACING: CPT

## 2022-01-01 PROCEDURE — 99215 OFFICE O/P EST HI 40 MIN: CPT | Performed by: INTERNAL MEDICINE

## 2022-01-01 PROCEDURE — G8432 DEP SCR NOT DOC, RNG: HCPCS | Performed by: INTERNAL MEDICINE

## 2022-01-01 PROCEDURE — 1101F PT FALLS ASSESS-DOCD LE1/YR: CPT | Performed by: INTERNAL MEDICINE

## 2022-01-01 PROCEDURE — 74011000258 HC RX REV CODE- 258: Performed by: EMERGENCY MEDICINE

## 2022-01-01 PROCEDURE — 83880 ASSAY OF NATRIURETIC PEPTIDE: CPT

## 2022-01-01 PROCEDURE — 87635 SARS-COV-2 COVID-19 AMP PRB: CPT

## 2022-01-01 PROCEDURE — 84484 ASSAY OF TROPONIN QUANT: CPT

## 2022-01-01 PROCEDURE — 80048 BASIC METABOLIC PNL TOTAL CA: CPT

## 2022-01-01 PROCEDURE — 84100 ASSAY OF PHOSPHORUS: CPT

## 2022-01-01 PROCEDURE — 82565 ASSAY OF CREATININE: CPT

## 2022-01-01 PROCEDURE — 74011250636 HC RX REV CODE- 250/636: Performed by: NURSE PRACTITIONER

## 2022-01-01 PROCEDURE — G0299 HHS/HOSPICE OF RN EA 15 MIN: HCPCS

## 2022-01-01 PROCEDURE — 94760 N-INVAS EAR/PLS OXIMETRY 1: CPT

## 2022-01-01 PROCEDURE — 86706 HEP B SURFACE ANTIBODY: CPT

## 2022-01-01 PROCEDURE — 71045 X-RAY EXAM CHEST 1 VIEW: CPT

## 2022-01-01 PROCEDURE — G8427 DOCREV CUR MEDS BY ELIG CLIN: HCPCS | Performed by: INTERNAL MEDICINE

## 2022-01-01 PROCEDURE — 87086 URINE CULTURE/COLONY COUNT: CPT

## 2022-01-01 PROCEDURE — 74011000250 HC RX REV CODE- 250: Performed by: INTERNAL MEDICINE

## 2022-01-01 PROCEDURE — 81001 URINALYSIS AUTO W/SCOPE: CPT

## 2022-01-01 PROCEDURE — 3336500001 HSPC ELECTION

## 2022-01-01 PROCEDURE — 78306 BONE IMAGING WHOLE BODY: CPT

## 2022-01-01 PROCEDURE — 74011250637 HC RX REV CODE- 250/637: Performed by: NURSE PRACTITIONER

## 2022-01-01 PROCEDURE — 85025 COMPLETE CBC W/AUTO DIFF WBC: CPT

## 2022-01-01 PROCEDURE — 77010033678 HC OXYGEN DAILY

## 2022-01-01 PROCEDURE — 65660000000 HC RM CCU STEPDOWN

## 2022-01-01 PROCEDURE — 74011250636 HC RX REV CODE- 250/636: Performed by: FAMILY MEDICINE

## 2022-01-01 PROCEDURE — 36415 COLL VENOUS BLD VENIPUNCTURE: CPT

## 2022-01-01 PROCEDURE — 83735 ASSAY OF MAGNESIUM: CPT

## 2022-01-01 PROCEDURE — 0656 HSPC GENERAL INPATIENT

## 2022-01-01 PROCEDURE — 65270000029 HC RM PRIVATE

## 2022-01-01 PROCEDURE — 96417 CHEMO IV INFUS EACH ADDL SEQ: CPT

## 2022-01-01 PROCEDURE — 96415 CHEMO IV INFUSION ADDL HR: CPT

## 2022-01-01 PROCEDURE — 77030038269 HC DRN EXT URIN PURWCK BARD -A

## 2022-01-01 PROCEDURE — G8536 NO DOC ELDER MAL SCRN: HCPCS | Performed by: INTERNAL MEDICINE

## 2022-01-01 PROCEDURE — 1090F PRES/ABSN URINE INCON ASSESS: CPT | Performed by: INTERNAL MEDICINE

## 2022-01-01 PROCEDURE — G8420 CALC BMI NORM PARAMETERS: HCPCS | Performed by: INTERNAL MEDICINE

## 2022-01-01 PROCEDURE — 93971 EXTREMITY STUDY: CPT

## 2022-01-01 PROCEDURE — 87340 HEPATITIS B SURFACE AG IA: CPT

## 2022-01-01 PROCEDURE — 83605 ASSAY OF LACTIC ACID: CPT

## 2022-01-01 PROCEDURE — G0300 HHS/HOSPICE OF LPN EA 15 MIN: HCPCS

## 2022-01-01 PROCEDURE — 82803 BLOOD GASES ANY COMBINATION: CPT

## 2022-01-01 PROCEDURE — 99233 SBSQ HOSP IP/OBS HIGH 50: CPT | Performed by: INTERNAL MEDICINE

## 2022-01-01 PROCEDURE — G0463 HOSPITAL OUTPT CLINIC VISIT: HCPCS | Performed by: INTERNAL MEDICINE

## 2022-01-01 PROCEDURE — 99223 1ST HOSP IP/OBS HIGH 75: CPT | Performed by: FAMILY MEDICINE

## 2022-01-01 PROCEDURE — 77030033032 HC DRSG MAXORB AG MDII -A

## 2022-01-01 PROCEDURE — 87040 BLOOD CULTURE FOR BACTERIA: CPT

## 2022-01-01 PROCEDURE — 93356 MYOCRD STRAIN IMG SPCKL TRCK: CPT | Performed by: INTERNAL MEDICINE

## 2022-01-01 PROCEDURE — 77030037877 HC DRSG MEPILEX >48IN BORD MOLN -A

## 2022-01-01 PROCEDURE — 96413 CHEMO IV INFUSION 1 HR: CPT

## 2022-01-01 PROCEDURE — 93306 TTE W/DOPPLER COMPLETE: CPT | Performed by: INTERNAL MEDICINE

## 2022-01-01 PROCEDURE — 99223 1ST HOSP IP/OBS HIGH 75: CPT | Performed by: INTERNAL MEDICINE

## 2022-01-01 PROCEDURE — 96361 HYDRATE IV INFUSION ADD-ON: CPT

## 2022-01-01 PROCEDURE — 99223 1ST HOSP IP/OBS HIGH 75: CPT | Performed by: NURSE PRACTITIONER

## 2022-01-01 PROCEDURE — 86704 HEP B CORE ANTIBODY TOTAL: CPT

## 2022-01-01 PROCEDURE — 96374 THER/PROPH/DIAG INJ IV PUSH: CPT

## 2022-01-01 PROCEDURE — 77030011256 HC DRSG MEPILEX <16IN NO BORD MOLN -A

## 2022-01-01 PROCEDURE — 74011000636 HC RX REV CODE- 636: Performed by: RADIOLOGY

## 2022-01-01 PROCEDURE — 82330 ASSAY OF CALCIUM: CPT

## 2022-01-01 PROCEDURE — 74177 CT ABD & PELVIS W/CONTRAST: CPT

## 2022-01-01 PROCEDURE — 77030040393 HC DRSG OPTIFOAM GENT MDII -B

## 2022-01-01 PROCEDURE — 74011250636 HC RX REV CODE- 250/636: Performed by: EMERGENCY MEDICINE

## 2022-01-01 RX ORDER — SODIUM CHLORIDE 0.9 % (FLUSH) 0.9 %
10 SYRINGE (ML) INJECTION AS NEEDED
Status: CANCELLED | OUTPATIENT
Start: 2022-01-01

## 2022-01-01 RX ORDER — IPRATROPIUM BROMIDE AND ALBUTEROL SULFATE 2.5; .5 MG/3ML; MG/3ML
3 SOLUTION RESPIRATORY (INHALATION)
Status: DISCONTINUED | OUTPATIENT
Start: 2022-01-01 | End: 2022-01-01

## 2022-01-01 RX ORDER — ALBUTEROL SULFATE 0.83 MG/ML
2.5 SOLUTION RESPIRATORY (INHALATION) AS NEEDED
Status: CANCELLED
Start: 2022-04-13

## 2022-01-01 RX ORDER — AMOXICILLIN 250 MG
1 CAPSULE ORAL 2 TIMES DAILY
Status: DISCONTINUED | OUTPATIENT
Start: 2022-01-01 | End: 2022-01-01

## 2022-01-01 RX ORDER — POLYETHYLENE GLYCOL 3350 17 G/17G
17 POWDER, FOR SOLUTION ORAL DAILY PRN
Status: DISCONTINUED | OUTPATIENT
Start: 2022-01-01 | End: 2022-01-01 | Stop reason: HOSPADM

## 2022-01-01 RX ORDER — CALCITONIN SALMON 200 [USP'U]/ML
4 INJECTION, SOLUTION INTRAMUSCULAR; SUBCUTANEOUS EVERY 12 HOURS
Status: COMPLETED | OUTPATIENT
Start: 2022-01-01 | End: 2022-01-01

## 2022-01-01 RX ORDER — ACETAMINOPHEN 325 MG/1
650 TABLET ORAL
Status: CANCELLED | OUTPATIENT
Start: 2022-03-23

## 2022-01-01 RX ORDER — HYDROMORPHONE HYDROCHLORIDE 2 MG/ML
2 INJECTION, SOLUTION INTRAMUSCULAR; INTRAVENOUS; SUBCUTANEOUS
Status: DISCONTINUED | OUTPATIENT
Start: 2022-01-01 | End: 2022-01-01

## 2022-01-01 RX ORDER — ACETAMINOPHEN 325 MG/1
650 TABLET ORAL AS NEEDED
Status: CANCELLED
Start: 2022-05-04

## 2022-01-01 RX ORDER — OXYCODONE HYDROCHLORIDE 5 MG/1
10 TABLET ORAL EVERY 6 HOURS
Status: DISCONTINUED | OUTPATIENT
Start: 2022-01-01 | End: 2022-01-01 | Stop reason: HOSPADM

## 2022-01-01 RX ORDER — DIPHENHYDRAMINE HYDROCHLORIDE 50 MG/ML
25 INJECTION, SOLUTION INTRAMUSCULAR; INTRAVENOUS AS NEEDED
Status: CANCELLED
Start: 2022-03-23

## 2022-01-01 RX ORDER — LORAZEPAM 2 MG/ML
1 INJECTION INTRAMUSCULAR
Status: DISCONTINUED | OUTPATIENT
Start: 2022-01-01 | End: 2022-01-01

## 2022-01-01 RX ORDER — SODIUM CHLORIDE 9 MG/ML
10 INJECTION INTRAMUSCULAR; INTRAVENOUS; SUBCUTANEOUS AS NEEDED
Status: CANCELLED | OUTPATIENT
Start: 2022-05-04

## 2022-01-01 RX ORDER — HEPARIN 100 UNIT/ML
300-500 SYRINGE INTRAVENOUS AS NEEDED
Status: CANCELLED
Start: 2022-03-23

## 2022-01-01 RX ORDER — EPINEPHRINE 1 MG/ML
0.3 INJECTION, SOLUTION, CONCENTRATE INTRAVENOUS AS NEEDED
Status: CANCELLED | OUTPATIENT
Start: 2022-03-23

## 2022-01-01 RX ORDER — HYDROMORPHONE HYDROCHLORIDE 1 MG/ML
1 INJECTION, SOLUTION INTRAMUSCULAR; INTRAVENOUS; SUBCUTANEOUS ONCE
Status: COMPLETED | OUTPATIENT
Start: 2022-01-01 | End: 2022-01-01

## 2022-01-01 RX ORDER — POTASSIUM CHLORIDE 750 MG/1
40 TABLET, FILM COATED, EXTENDED RELEASE ORAL
Status: COMPLETED | OUTPATIENT
Start: 2022-01-01 | End: 2022-01-01

## 2022-01-01 RX ORDER — DIPHENHYDRAMINE HYDROCHLORIDE 50 MG/ML
50 INJECTION, SOLUTION INTRAMUSCULAR; INTRAVENOUS AS NEEDED
Status: CANCELLED
Start: 2022-04-13

## 2022-01-01 RX ORDER — ONDANSETRON 2 MG/ML
8 INJECTION INTRAMUSCULAR; INTRAVENOUS AS NEEDED
Status: CANCELLED | OUTPATIENT
Start: 2022-04-13

## 2022-01-01 RX ORDER — ENOXAPARIN SODIUM 100 MG/ML
40 INJECTION SUBCUTANEOUS DAILY
Status: DISCONTINUED | OUTPATIENT
Start: 2022-01-01 | End: 2022-01-01 | Stop reason: HOSPADM

## 2022-01-01 RX ORDER — SODIUM CHLORIDE 9 MG/ML
25 INJECTION, SOLUTION INTRAVENOUS CONTINUOUS
Status: CANCELLED | OUTPATIENT
Start: 2022-05-04

## 2022-01-01 RX ORDER — ONDANSETRON HYDROCHLORIDE 8 MG/1
8 TABLET, FILM COATED ORAL
Qty: 60 TABLET | Refills: 3 | Status: SHIPPED | OUTPATIENT
Start: 2022-01-01

## 2022-01-01 RX ORDER — LORAZEPAM 2 MG/ML
1 INJECTION INTRAMUSCULAR
Status: DISCONTINUED | OUTPATIENT
Start: 2022-01-01 | End: 2022-01-01 | Stop reason: HOSPADM

## 2022-01-01 RX ORDER — HYDROCODONE BITARTRATE AND ACETAMINOPHEN 5; 325 MG/1; MG/1
1 TABLET ORAL
Qty: 40 TABLET | Refills: 0 | Status: SHIPPED | OUTPATIENT
Start: 2022-01-01 | End: 2022-04-01

## 2022-01-01 RX ORDER — KETOROLAC TROMETHAMINE 30 MG/ML
30 INJECTION, SOLUTION INTRAMUSCULAR; INTRAVENOUS
Status: DISCONTINUED | OUTPATIENT
Start: 2022-01-01 | End: 2022-01-01 | Stop reason: HOSPADM

## 2022-01-01 RX ORDER — SODIUM CHLORIDE 0.9 % (FLUSH) 0.9 %
10 SYRINGE (ML) INJECTION AS NEEDED
Status: CANCELLED | OUTPATIENT
Start: 2022-05-04

## 2022-01-01 RX ORDER — ACETAMINOPHEN 325 MG/1
650 TABLET ORAL AS NEEDED
Status: CANCELLED
Start: 2022-04-13

## 2022-01-01 RX ORDER — SODIUM CHLORIDE 0.9 % (FLUSH) 0.9 %
5-40 SYRINGE (ML) INJECTION AS NEEDED
Status: DISCONTINUED | OUTPATIENT
Start: 2022-01-01 | End: 2022-01-01 | Stop reason: HOSPADM

## 2022-01-01 RX ORDER — HEPARIN 100 UNIT/ML
300-500 SYRINGE INTRAVENOUS AS NEEDED
Status: CANCELLED
Start: 2022-04-13

## 2022-01-01 RX ORDER — HYDROMORPHONE HYDROCHLORIDE 1 MG/ML
1 INJECTION, SOLUTION INTRAMUSCULAR; INTRAVENOUS; SUBCUTANEOUS
Status: DISCONTINUED | OUTPATIENT
Start: 2022-01-01 | End: 2022-01-01 | Stop reason: HOSPADM

## 2022-01-01 RX ORDER — SODIUM CHLORIDE 0.9 % (FLUSH) 0.9 %
10 SYRINGE (ML) INJECTION AS NEEDED
Status: CANCELLED | OUTPATIENT
Start: 2022-04-13

## 2022-01-01 RX ORDER — HEPARIN 100 UNIT/ML
300-500 SYRINGE INTRAVENOUS AS NEEDED
Status: CANCELLED
Start: 2022-01-01

## 2022-01-01 RX ORDER — SODIUM CHLORIDE 9 MG/ML
10 INJECTION INTRAMUSCULAR; INTRAVENOUS; SUBCUTANEOUS AS NEEDED
Status: CANCELLED | OUTPATIENT
Start: 2022-01-01

## 2022-01-01 RX ORDER — SODIUM CHLORIDE 9 MG/ML
25 INJECTION, SOLUTION INTRAVENOUS CONTINUOUS
Status: DISCONTINUED | OUTPATIENT
Start: 2022-01-01 | End: 2022-01-01 | Stop reason: HOSPADM

## 2022-01-01 RX ORDER — ONDANSETRON 2 MG/ML
4 INJECTION INTRAMUSCULAR; INTRAVENOUS
Status: DISCONTINUED | OUTPATIENT
Start: 2022-01-01 | End: 2022-01-01 | Stop reason: HOSPADM

## 2022-01-01 RX ORDER — HYDROCODONE BITARTRATE AND ACETAMINOPHEN 5; 325 MG/1; MG/1
1 TABLET ORAL
Status: DISCONTINUED | OUTPATIENT
Start: 2022-01-01 | End: 2022-01-01

## 2022-01-01 RX ORDER — LORAZEPAM 2 MG/ML
0.5 INJECTION INTRAMUSCULAR
Status: DISCONTINUED | OUTPATIENT
Start: 2022-01-01 | End: 2022-01-01 | Stop reason: HOSPADM

## 2022-01-01 RX ORDER — DIPHENHYDRAMINE HYDROCHLORIDE 50 MG/ML
50 INJECTION, SOLUTION INTRAMUSCULAR; INTRAVENOUS
Status: CANCELLED | OUTPATIENT
Start: 2022-03-23

## 2022-01-01 RX ORDER — DIPHENHYDRAMINE HYDROCHLORIDE 50 MG/ML
25 INJECTION, SOLUTION INTRAMUSCULAR; INTRAVENOUS ONCE
Status: COMPLETED | OUTPATIENT
Start: 2022-01-01 | End: 2022-01-01

## 2022-01-01 RX ORDER — IPRATROPIUM BROMIDE AND ALBUTEROL SULFATE 2.5; .5 MG/3ML; MG/3ML
3 SOLUTION RESPIRATORY (INHALATION)
Status: DISCONTINUED | OUTPATIENT
Start: 2022-01-01 | End: 2022-01-01 | Stop reason: HOSPADM

## 2022-01-01 RX ORDER — ONDANSETRON 2 MG/ML
8 INJECTION INTRAMUSCULAR; INTRAVENOUS AS NEEDED
Status: CANCELLED | OUTPATIENT
Start: 2022-01-01

## 2022-01-01 RX ORDER — IPRATROPIUM BROMIDE AND ALBUTEROL SULFATE 2.5; .5 MG/3ML; MG/3ML
3 SOLUTION RESPIRATORY (INHALATION)
Qty: 30 NEBULE | Refills: 0 | Status: SHIPPED
Start: 2022-01-01

## 2022-01-01 RX ORDER — GLYCOPYRROLATE 0.2 MG/ML
0.2 INJECTION INTRAMUSCULAR; INTRAVENOUS
Status: DISCONTINUED | OUTPATIENT
Start: 2022-01-01 | End: 2022-01-01 | Stop reason: HOSPADM

## 2022-01-01 RX ORDER — HYDROMORPHONE HYDROCHLORIDE 1 MG/ML
0.5 INJECTION, SOLUTION INTRAMUSCULAR; INTRAVENOUS; SUBCUTANEOUS
Status: DISCONTINUED | OUTPATIENT
Start: 2022-01-01 | End: 2022-01-01

## 2022-01-01 RX ORDER — HYDROCORTISONE SODIUM SUCCINATE 100 MG/2ML
100 INJECTION, POWDER, FOR SOLUTION INTRAMUSCULAR; INTRAVENOUS AS NEEDED
Status: CANCELLED | OUTPATIENT
Start: 2022-03-23

## 2022-01-01 RX ORDER — ACETAMINOPHEN 325 MG/1
650 TABLET ORAL AS NEEDED
Status: CANCELLED
Start: 2022-03-23

## 2022-01-01 RX ORDER — EPINEPHRINE 1 MG/ML
0.3 INJECTION, SOLUTION, CONCENTRATE INTRAVENOUS AS NEEDED
Status: CANCELLED | OUTPATIENT
Start: 2022-04-13

## 2022-01-01 RX ORDER — EPINEPHRINE 1 MG/ML
0.3 INJECTION, SOLUTION, CONCENTRATE INTRAVENOUS AS NEEDED
Status: CANCELLED | OUTPATIENT
Start: 2022-01-01

## 2022-01-01 RX ORDER — DIPHENHYDRAMINE HYDROCHLORIDE 50 MG/ML
50 INJECTION, SOLUTION INTRAMUSCULAR; INTRAVENOUS
Status: CANCELLED | OUTPATIENT
Start: 2022-04-13

## 2022-01-01 RX ORDER — HYDROCORTISONE SODIUM SUCCINATE 100 MG/2ML
100 INJECTION, POWDER, FOR SOLUTION INTRAMUSCULAR; INTRAVENOUS AS NEEDED
Status: CANCELLED | OUTPATIENT
Start: 2022-04-13

## 2022-01-01 RX ORDER — SODIUM CHLORIDE 9 MG/ML
100 INJECTION, SOLUTION INTRAVENOUS CONTINUOUS
Status: DISCONTINUED | OUTPATIENT
Start: 2022-01-01 | End: 2022-01-01 | Stop reason: HOSPADM

## 2022-01-01 RX ORDER — HYDROMORPHONE HYDROCHLORIDE 1 MG/ML
1 INJECTION, SOLUTION INTRAMUSCULAR; INTRAVENOUS; SUBCUTANEOUS
Status: DISCONTINUED | OUTPATIENT
Start: 2022-01-01 | End: 2022-01-01

## 2022-01-01 RX ORDER — ACETAMINOPHEN 325 MG/1
650 TABLET ORAL AS NEEDED
Status: CANCELLED
Start: 2022-01-01

## 2022-01-01 RX ORDER — HALOPERIDOL 1 MG/1
2 TABLET ORAL EVERY 12 HOURS
Status: DISCONTINUED | OUTPATIENT
Start: 2022-01-01 | End: 2022-01-01

## 2022-01-01 RX ORDER — SODIUM CHLORIDE 9 MG/ML
10 INJECTION INTRAMUSCULAR; INTRAVENOUS; SUBCUTANEOUS AS NEEDED
Status: CANCELLED | OUTPATIENT
Start: 2022-04-13

## 2022-01-01 RX ORDER — SENNOSIDES 8.6 MG/1
2 TABLET ORAL
Status: DISCONTINUED | OUTPATIENT
Start: 2022-01-01 | End: 2022-01-01

## 2022-01-01 RX ORDER — ONDANSETRON 2 MG/ML
8 INJECTION INTRAMUSCULAR; INTRAVENOUS AS NEEDED
Status: CANCELLED | OUTPATIENT
Start: 2022-05-04

## 2022-01-01 RX ORDER — TRAMADOL HYDROCHLORIDE 50 MG/1
50 TABLET ORAL
Qty: 30 TABLET | Refills: 0 | Status: SHIPPED | OUTPATIENT
Start: 2022-01-01 | End: 2022-01-01

## 2022-01-01 RX ORDER — ALBUTEROL SULFATE 0.83 MG/ML
2.5 SOLUTION RESPIRATORY (INHALATION) AS NEEDED
Status: CANCELLED
Start: 2022-05-04

## 2022-01-01 RX ORDER — HALOPERIDOL 5 MG/ML
2 INJECTION INTRAMUSCULAR
Status: DISCONTINUED | OUTPATIENT
Start: 2022-01-01 | End: 2022-01-01 | Stop reason: HOSPADM

## 2022-01-01 RX ORDER — ALBUTEROL SULFATE 0.83 MG/ML
2.5 SOLUTION RESPIRATORY (INHALATION) AS NEEDED
Status: CANCELLED
Start: 2022-01-01

## 2022-01-01 RX ORDER — SODIUM CHLORIDE 9 MG/ML
10 INJECTION INTRAMUSCULAR; INTRAVENOUS; SUBCUTANEOUS AS NEEDED
Status: CANCELLED | OUTPATIENT
Start: 2022-03-23

## 2022-01-01 RX ORDER — ALBUTEROL SULFATE 0.83 MG/ML
2.5 SOLUTION RESPIRATORY (INHALATION) AS NEEDED
Status: CANCELLED
Start: 2022-03-23

## 2022-01-01 RX ORDER — DIPHENHYDRAMINE HYDROCHLORIDE 50 MG/ML
25 INJECTION, SOLUTION INTRAMUSCULAR; INTRAVENOUS AS NEEDED
Status: CANCELLED
Start: 2022-05-04

## 2022-01-01 RX ORDER — SENNOSIDES 8.6 MG/1
2 TABLET ORAL 2 TIMES DAILY
Status: DISCONTINUED | OUTPATIENT
Start: 2022-01-01 | End: 2022-01-01 | Stop reason: HOSPADM

## 2022-01-01 RX ORDER — DIPHENHYDRAMINE HYDROCHLORIDE 50 MG/ML
50 INJECTION, SOLUTION INTRAMUSCULAR; INTRAVENOUS
Status: CANCELLED | OUTPATIENT
Start: 2022-05-04

## 2022-01-01 RX ORDER — LORAZEPAM 2 MG/ML
2 INJECTION INTRAMUSCULAR
Status: DISCONTINUED | OUTPATIENT
Start: 2022-01-01 | End: 2022-01-01

## 2022-01-01 RX ORDER — DIPHENHYDRAMINE HYDROCHLORIDE 50 MG/ML
25 INJECTION, SOLUTION INTRAMUSCULAR; INTRAVENOUS AS NEEDED
Status: CANCELLED
Start: 2022-01-01

## 2022-01-01 RX ORDER — HEPARIN 100 UNIT/ML
300-500 SYRINGE INTRAVENOUS AS NEEDED
Status: CANCELLED
Start: 2022-05-04

## 2022-01-01 RX ORDER — EPINEPHRINE 1 MG/ML
0.3 INJECTION, SOLUTION, CONCENTRATE INTRAVENOUS AS NEEDED
Status: CANCELLED | OUTPATIENT
Start: 2022-05-04

## 2022-01-01 RX ORDER — DIPHENHYDRAMINE HYDROCHLORIDE 50 MG/ML
50 INJECTION, SOLUTION INTRAMUSCULAR; INTRAVENOUS AS NEEDED
Status: CANCELLED
Start: 2022-03-23

## 2022-01-01 RX ORDER — DIPHENHYDRAMINE HYDROCHLORIDE 50 MG/ML
25 INJECTION, SOLUTION INTRAMUSCULAR; INTRAVENOUS AS NEEDED
Status: CANCELLED
Start: 2022-04-13

## 2022-01-01 RX ORDER — FACIAL-BODY WIPES
10 EACH TOPICAL DAILY PRN
Status: DISCONTINUED | OUTPATIENT
Start: 2022-01-01 | End: 2022-01-01 | Stop reason: HOSPADM

## 2022-01-01 RX ORDER — HALOPERIDOL 1 MG/1
2 TABLET ORAL EVERY 8 HOURS
Status: DISCONTINUED | OUTPATIENT
Start: 2022-01-01 | End: 2022-01-01 | Stop reason: HOSPADM

## 2022-01-01 RX ORDER — ONDANSETRON 4 MG/1
4 TABLET, ORALLY DISINTEGRATING ORAL
Status: DISCONTINUED | OUTPATIENT
Start: 2022-01-01 | End: 2022-01-01 | Stop reason: HOSPADM

## 2022-01-01 RX ORDER — ONDANSETRON 2 MG/ML
8 INJECTION INTRAMUSCULAR; INTRAVENOUS AS NEEDED
Status: CANCELLED | OUTPATIENT
Start: 2022-03-23

## 2022-01-01 RX ORDER — HALOPERIDOL 1 MG/1
2 TABLET ORAL ONCE
Status: COMPLETED | OUTPATIENT
Start: 2022-01-01 | End: 2022-01-01

## 2022-01-01 RX ORDER — HYDROMORPHONE HYDROCHLORIDE 1 MG/ML
0.5 INJECTION, SOLUTION INTRAMUSCULAR; INTRAVENOUS; SUBCUTANEOUS
Status: DISCONTINUED | OUTPATIENT
Start: 2022-01-01 | End: 2022-01-01 | Stop reason: HOSPADM

## 2022-01-01 RX ORDER — HYDROCORTISONE SODIUM SUCCINATE 100 MG/2ML
100 INJECTION, POWDER, FOR SOLUTION INTRAMUSCULAR; INTRAVENOUS AS NEEDED
Status: CANCELLED | OUTPATIENT
Start: 2022-05-04

## 2022-01-01 RX ORDER — ACETAMINOPHEN 650 MG/1
650 SUPPOSITORY RECTAL
Status: DISCONTINUED | OUTPATIENT
Start: 2022-01-01 | End: 2022-01-01 | Stop reason: HOSPADM

## 2022-01-01 RX ORDER — DIPHENHYDRAMINE HYDROCHLORIDE 50 MG/ML
50 INJECTION, SOLUTION INTRAMUSCULAR; INTRAVENOUS AS NEEDED
Status: CANCELLED
Start: 2022-01-01

## 2022-01-01 RX ORDER — SODIUM CHLORIDE 9 MG/ML
25 INJECTION, SOLUTION INTRAVENOUS CONTINUOUS
Status: CANCELLED | OUTPATIENT
Start: 2022-01-01

## 2022-01-01 RX ORDER — ACETAMINOPHEN 325 MG/1
650 TABLET ORAL
Status: CANCELLED | OUTPATIENT
Start: 2022-05-04

## 2022-01-01 RX ORDER — OXYCODONE HYDROCHLORIDE 5 MG/1
5 TABLET ORAL
Status: DISCONTINUED | OUTPATIENT
Start: 2022-01-01 | End: 2022-01-01

## 2022-01-01 RX ORDER — SODIUM CHLORIDE 0.9 % (FLUSH) 0.9 %
5-40 SYRINGE (ML) INJECTION EVERY 8 HOURS
Status: DISCONTINUED | OUTPATIENT
Start: 2022-01-01 | End: 2022-01-01 | Stop reason: HOSPADM

## 2022-01-01 RX ORDER — ACETAMINOPHEN 325 MG/1
650 TABLET ORAL
Status: DISCONTINUED | OUTPATIENT
Start: 2022-01-01 | End: 2022-01-01 | Stop reason: HOSPADM

## 2022-01-01 RX ORDER — SODIUM CHLORIDE 0.9 % (FLUSH) 0.9 %
10 SYRINGE (ML) INJECTION AS NEEDED
Status: CANCELLED | OUTPATIENT
Start: 2022-03-23

## 2022-01-01 RX ORDER — SODIUM CHLORIDE 9 MG/ML
25 INJECTION, SOLUTION INTRAVENOUS CONTINUOUS
Status: CANCELLED | OUTPATIENT
Start: 2022-03-23

## 2022-01-01 RX ORDER — ACETAMINOPHEN 325 MG/1
650 TABLET ORAL
Status: CANCELLED | OUTPATIENT
Start: 2022-04-13

## 2022-01-01 RX ORDER — DIPHENHYDRAMINE HYDROCHLORIDE 50 MG/ML
50 INJECTION, SOLUTION INTRAMUSCULAR; INTRAVENOUS AS NEEDED
Status: CANCELLED
Start: 2022-05-04

## 2022-01-01 RX ORDER — SODIUM CHLORIDE 9 MG/ML
25 INJECTION, SOLUTION INTRAVENOUS CONTINUOUS
Status: CANCELLED | OUTPATIENT
Start: 2022-04-13

## 2022-01-01 RX ORDER — HYDROCORTISONE SODIUM SUCCINATE 100 MG/2ML
100 INJECTION, POWDER, FOR SOLUTION INTRAMUSCULAR; INTRAVENOUS AS NEEDED
Status: CANCELLED | OUTPATIENT
Start: 2022-01-01

## 2022-01-01 RX ADMIN — POTASSIUM PHOSPHATE, MONOBASIC POTASSIUM PHOSPHATE, DIBASIC: 224; 236 INJECTION, SOLUTION, CONCENTRATE INTRAVENOUS at 14:53

## 2022-01-01 RX ADMIN — HYDROMORPHONE HYDROCHLORIDE 0.5 MG: 1 INJECTION, SOLUTION INTRAMUSCULAR; INTRAVENOUS; SUBCUTANEOUS at 04:08

## 2022-01-01 RX ADMIN — OXYCODONE 5 MG: 5 TABLET ORAL at 06:32

## 2022-01-01 RX ADMIN — OXYCODONE 10 MG: 5 TABLET ORAL at 05:54

## 2022-01-01 RX ADMIN — SENNOSIDES 17.2 MG: 8.6 TABLET, FILM COATED ORAL at 09:07

## 2022-01-01 RX ADMIN — HYDROMORPHONE HYDROCHLORIDE 0.5 MG: 1 INJECTION, SOLUTION INTRAMUSCULAR; INTRAVENOUS; SUBCUTANEOUS at 23:32

## 2022-01-01 RX ADMIN — HYDROMORPHONE HYDROCHLORIDE 0.5 MG: 1 INJECTION, SOLUTION INTRAMUSCULAR; INTRAVENOUS; SUBCUTANEOUS at 15:44

## 2022-01-01 RX ADMIN — HALOPERIDOL 2 MG: 1 TABLET ORAL at 20:01

## 2022-01-01 RX ADMIN — Medication 10 ML: at 13:15

## 2022-01-01 RX ADMIN — OXYCODONE 10 MG: 5 TABLET ORAL at 23:32

## 2022-01-01 RX ADMIN — HYDROMORPHONE HYDROCHLORIDE 2 MG: 2 INJECTION INTRAMUSCULAR; INTRAVENOUS; SUBCUTANEOUS at 03:18

## 2022-01-01 RX ADMIN — SODIUM CHLORIDE 1000 ML: 9 INJECTION, SOLUTION INTRAVENOUS at 18:14

## 2022-01-01 RX ADMIN — HYDROMORPHONE HYDROCHLORIDE 1 MG: 1 INJECTION, SOLUTION INTRAMUSCULAR; INTRAVENOUS; SUBCUTANEOUS at 03:40

## 2022-01-01 RX ADMIN — DIPHENHYDRAMINE HYDROCHLORIDE 25 MG: 50 INJECTION, SOLUTION INTRAMUSCULAR; INTRAVENOUS at 04:13

## 2022-01-01 RX ADMIN — GLYCOPYRROLATE 0.2 MG: 0.2 INJECTION, SOLUTION INTRAMUSCULAR; INTRAVENOUS at 03:40

## 2022-01-01 RX ADMIN — ONDANSETRON 4 MG: 2 INJECTION INTRAMUSCULAR; INTRAVENOUS at 08:39

## 2022-01-01 RX ADMIN — HYDROMORPHONE HYDROCHLORIDE 1 MG: 1 INJECTION, SOLUTION INTRAMUSCULAR; INTRAVENOUS; SUBCUTANEOUS at 11:51

## 2022-01-01 RX ADMIN — SODIUM CHLORIDE, PRESERVATIVE FREE 10 ML: 5 INJECTION INTRAVENOUS at 02:35

## 2022-01-01 RX ADMIN — DOCUSATE SODIUM 50MG AND SENNOSIDES 8.6MG 1 TABLET: 8.6; 5 TABLET, FILM COATED ORAL at 17:17

## 2022-01-01 RX ADMIN — HALOPERIDOL 2 MG: 1 TABLET ORAL at 06:36

## 2022-01-01 RX ADMIN — OXYCODONE 10 MG: 5 TABLET ORAL at 11:45

## 2022-01-01 RX ADMIN — Medication 10 ML: at 15:48

## 2022-01-01 RX ADMIN — LORAZEPAM 0.5 MG: 2 INJECTION INTRAMUSCULAR at 13:13

## 2022-01-01 RX ADMIN — HYDROMORPHONE HYDROCHLORIDE 1 MG: 1 INJECTION, SOLUTION INTRAMUSCULAR; INTRAVENOUS; SUBCUTANEOUS at 04:21

## 2022-01-01 RX ADMIN — LORAZEPAM 0.5 MG: 2 INJECTION INTRAMUSCULAR at 03:19

## 2022-01-01 RX ADMIN — HYDROMORPHONE HYDROCHLORIDE 0.5 MG: 1 INJECTION, SOLUTION INTRAMUSCULAR; INTRAVENOUS; SUBCUTANEOUS at 02:17

## 2022-01-01 RX ADMIN — OXYCODONE 10 MG: 5 TABLET ORAL at 13:14

## 2022-01-01 RX ADMIN — HYDROMORPHONE HYDROCHLORIDE 0.5 MG: 1 INJECTION, SOLUTION INTRAMUSCULAR; INTRAVENOUS; SUBCUTANEOUS at 00:53

## 2022-01-01 RX ADMIN — OXYCODONE 10 MG: 5 TABLET ORAL at 17:34

## 2022-01-01 RX ADMIN — HYDROCODONE BITARTRATE AND ACETAMINOPHEN 1 TABLET: 5; 325 TABLET ORAL at 20:43

## 2022-01-01 RX ADMIN — LORAZEPAM 1 MG: 2 INJECTION INTRAMUSCULAR at 22:11

## 2022-01-01 RX ADMIN — HYDROMORPHONE HYDROCHLORIDE 2 MG: 2 INJECTION INTRAMUSCULAR; INTRAVENOUS; SUBCUTANEOUS at 06:02

## 2022-01-01 RX ADMIN — HYDROMORPHONE HYDROCHLORIDE 1 MG: 1 INJECTION, SOLUTION INTRAMUSCULAR; INTRAVENOUS; SUBCUTANEOUS at 18:43

## 2022-01-01 RX ADMIN — HYDROMORPHONE HYDROCHLORIDE 1 MG: 1 INJECTION, SOLUTION INTRAMUSCULAR; INTRAVENOUS; SUBCUTANEOUS at 20:10

## 2022-01-01 RX ADMIN — SENNOSIDES 17.2 MG: 8.6 TABLET, FILM COATED ORAL at 20:00

## 2022-01-01 RX ADMIN — SENNOSIDES 17.2 MG: 8.6 TABLET, FILM COATED ORAL at 09:43

## 2022-01-01 RX ADMIN — ENOXAPARIN SODIUM 40 MG: 100 INJECTION SUBCUTANEOUS at 08:39

## 2022-01-01 RX ADMIN — LORAZEPAM 0.5 MG: 2 INJECTION INTRAMUSCULAR at 16:29

## 2022-01-01 RX ADMIN — HYDROMORPHONE HYDROCHLORIDE 1 MG: 1 INJECTION, SOLUTION INTRAMUSCULAR; INTRAVENOUS; SUBCUTANEOUS at 06:32

## 2022-01-01 RX ADMIN — CALCITONIN SALMON 244 INT'L UNITS: 200 INJECTION, SOLUTION INTRAMUSCULAR; SUBCUTANEOUS at 08:59

## 2022-01-01 RX ADMIN — SODIUM CHLORIDE 494 MG: 9 INJECTION, SOLUTION INTRAVENOUS at 13:31

## 2022-01-01 RX ADMIN — HALOPERIDOL 2 MG: 1 TABLET ORAL at 14:46

## 2022-01-01 RX ADMIN — Medication 10 ML: at 00:49

## 2022-01-01 RX ADMIN — Medication 10 ML: at 04:32

## 2022-01-01 RX ADMIN — HYDROMORPHONE HYDROCHLORIDE 1 MG: 1 INJECTION, SOLUTION INTRAMUSCULAR; INTRAVENOUS; SUBCUTANEOUS at 02:34

## 2022-01-01 RX ADMIN — LORAZEPAM 1 MG: 2 INJECTION INTRAMUSCULAR at 00:16

## 2022-01-01 RX ADMIN — HYDROMORPHONE HYDROCHLORIDE 1 MG: 1 INJECTION, SOLUTION INTRAMUSCULAR; INTRAVENOUS; SUBCUTANEOUS at 23:46

## 2022-01-01 RX ADMIN — ACETAMINOPHEN 650 MG: 325 TABLET ORAL at 22:00

## 2022-01-01 RX ADMIN — HYDROMORPHONE HYDROCHLORIDE 0.5 MG: 1 INJECTION, SOLUTION INTRAMUSCULAR; INTRAVENOUS; SUBCUTANEOUS at 15:50

## 2022-01-01 RX ADMIN — HYDROMORPHONE HYDROCHLORIDE 1 MG: 1 INJECTION, SOLUTION INTRAMUSCULAR; INTRAVENOUS; SUBCUTANEOUS at 21:02

## 2022-01-01 RX ADMIN — HYDROMORPHONE HYDROCHLORIDE 1 MG: 1 INJECTION, SOLUTION INTRAMUSCULAR; INTRAVENOUS; SUBCUTANEOUS at 04:17

## 2022-01-01 RX ADMIN — HYDROMORPHONE HYDROCHLORIDE 1 MG: 1 INJECTION, SOLUTION INTRAMUSCULAR; INTRAVENOUS; SUBCUTANEOUS at 18:11

## 2022-01-01 RX ADMIN — HYDROMORPHONE HYDROCHLORIDE 0.5 MG: 1 INJECTION, SOLUTION INTRAMUSCULAR; INTRAVENOUS; SUBCUTANEOUS at 06:36

## 2022-01-01 RX ADMIN — DOCUSATE SODIUM 50MG AND SENNOSIDES 8.6MG 1 TABLET: 8.6; 5 TABLET, FILM COATED ORAL at 09:17

## 2022-01-01 RX ADMIN — ONDANSETRON 4 MG: 2 INJECTION INTRAMUSCULAR; INTRAVENOUS at 02:33

## 2022-01-01 RX ADMIN — LORAZEPAM 1 MG: 2 INJECTION INTRAMUSCULAR at 18:55

## 2022-01-01 RX ADMIN — ZOLEDRONIC ACID 4 MG: 4 INJECTION INTRAVENOUS at 18:25

## 2022-01-01 RX ADMIN — OXYCODONE 10 MG: 5 TABLET ORAL at 06:36

## 2022-01-01 RX ADMIN — HYDROMORPHONE HYDROCHLORIDE 1 MG: 1 INJECTION, SOLUTION INTRAMUSCULAR; INTRAVENOUS; SUBCUTANEOUS at 12:33

## 2022-01-01 RX ADMIN — HYDROMORPHONE HYDROCHLORIDE 1 MG: 1 INJECTION, SOLUTION INTRAMUSCULAR; INTRAVENOUS; SUBCUTANEOUS at 03:20

## 2022-01-01 RX ADMIN — ENOXAPARIN SODIUM 40 MG: 100 INJECTION SUBCUTANEOUS at 09:18

## 2022-01-01 RX ADMIN — HYDROMORPHONE HYDROCHLORIDE 0.5 MG: 1 INJECTION, SOLUTION INTRAMUSCULAR; INTRAVENOUS; SUBCUTANEOUS at 05:54

## 2022-01-01 RX ADMIN — HYDROMORPHONE HYDROCHLORIDE 0.5 MG: 1 INJECTION, SOLUTION INTRAMUSCULAR; INTRAVENOUS; SUBCUTANEOUS at 20:01

## 2022-01-01 RX ADMIN — HYDROMORPHONE HYDROCHLORIDE 2 MG: 2 INJECTION INTRAMUSCULAR; INTRAVENOUS; SUBCUTANEOUS at 18:55

## 2022-01-01 RX ADMIN — HYDROMORPHONE HYDROCHLORIDE 0.5 MG: 1 INJECTION, SOLUTION INTRAMUSCULAR; INTRAVENOUS; SUBCUTANEOUS at 11:45

## 2022-01-01 RX ADMIN — SODIUM CHLORIDE, PRESERVATIVE FREE 10 ML: 5 INJECTION INTRAVENOUS at 02:34

## 2022-01-01 RX ADMIN — HYDROMORPHONE HYDROCHLORIDE 0.5 MG: 1 INJECTION, SOLUTION INTRAMUSCULAR; INTRAVENOUS; SUBCUTANEOUS at 22:47

## 2022-01-01 RX ADMIN — OXYCODONE 10 MG: 5 TABLET ORAL at 18:14

## 2022-01-01 RX ADMIN — HYDROMORPHONE HYDROCHLORIDE 1 MG: 1 INJECTION, SOLUTION INTRAMUSCULAR; INTRAVENOUS; SUBCUTANEOUS at 17:17

## 2022-01-01 RX ADMIN — LORAZEPAM 0.5 MG: 2 INJECTION INTRAMUSCULAR at 12:21

## 2022-01-01 RX ADMIN — POTASSIUM CHLORIDE 40 MEQ: 750 TABLET, EXTENDED RELEASE ORAL at 09:07

## 2022-01-01 RX ADMIN — HYDROMORPHONE HYDROCHLORIDE 1 MG: 1 INJECTION, SOLUTION INTRAMUSCULAR; INTRAVENOUS; SUBCUTANEOUS at 12:22

## 2022-01-01 RX ADMIN — LORAZEPAM 0.5 MG: 2 INJECTION INTRAMUSCULAR at 15:31

## 2022-01-01 RX ADMIN — HYDROMORPHONE HYDROCHLORIDE 0.5 MG: 1 INJECTION, SOLUTION INTRAMUSCULAR; INTRAVENOUS; SUBCUTANEOUS at 09:07

## 2022-01-01 RX ADMIN — ENOXAPARIN SODIUM 40 MG: 100 INJECTION SUBCUTANEOUS at 09:06

## 2022-01-01 RX ADMIN — HYDROMORPHONE HYDROCHLORIDE 1 MG: 1 INJECTION, SOLUTION INTRAMUSCULAR; INTRAVENOUS; SUBCUTANEOUS at 09:17

## 2022-01-01 RX ADMIN — SODIUM CHLORIDE 125 ML/HR: 9 INJECTION, SOLUTION INTRAVENOUS at 06:54

## 2022-01-01 RX ADMIN — Medication 10 ML: at 23:40

## 2022-01-01 RX ADMIN — HYDROMORPHONE HYDROCHLORIDE 0.5 MG: 1 INJECTION, SOLUTION INTRAMUSCULAR; INTRAVENOUS; SUBCUTANEOUS at 14:46

## 2022-01-01 RX ADMIN — LORAZEPAM 0.5 MG: 2 INJECTION INTRAMUSCULAR at 21:46

## 2022-01-01 RX ADMIN — LORAZEPAM 0.5 MG: 2 INJECTION INTRAMUSCULAR at 23:44

## 2022-01-01 RX ADMIN — HYDROMORPHONE HYDROCHLORIDE 0.5 MG: 1 INJECTION, SOLUTION INTRAMUSCULAR; INTRAVENOUS; SUBCUTANEOUS at 18:14

## 2022-01-01 RX ADMIN — CALCITONIN SALMON 244 INT'L UNITS: 200 INJECTION, SOLUTION INTRAMUSCULAR; SUBCUTANEOUS at 20:45

## 2022-01-01 RX ADMIN — Medication 10 ML: at 22:00

## 2022-01-01 RX ADMIN — LORAZEPAM 0.5 MG: 2 INJECTION INTRAMUSCULAR at 18:43

## 2022-01-01 RX ADMIN — KETOROLAC TROMETHAMINE 30 MG: 30 INJECTION, SOLUTION INTRAMUSCULAR; INTRAVENOUS at 16:29

## 2022-01-01 RX ADMIN — HYDROMORPHONE HYDROCHLORIDE 0.5 MG: 1 INJECTION, SOLUTION INTRAMUSCULAR; INTRAVENOUS; SUBCUTANEOUS at 17:34

## 2022-01-01 RX ADMIN — LORAZEPAM 0.5 MG: 2 INJECTION INTRAMUSCULAR at 03:40

## 2022-01-01 RX ADMIN — LORAZEPAM 0.5 MG: 2 INJECTION INTRAMUSCULAR at 00:49

## 2022-01-01 RX ADMIN — HYDROMORPHONE HYDROCHLORIDE 1 MG: 1 INJECTION, SOLUTION INTRAMUSCULAR; INTRAVENOUS; SUBCUTANEOUS at 15:31

## 2022-01-01 RX ADMIN — LORAZEPAM 0.5 MG: 2 INJECTION INTRAMUSCULAR at 20:11

## 2022-01-01 RX ADMIN — PERTUZUMAB 840 MG: 30 INJECTION, SOLUTION, CONCENTRATE INTRAVENOUS at 15:38

## 2022-01-01 RX ADMIN — HYDROMORPHONE HYDROCHLORIDE 1 MG: 1 INJECTION, SOLUTION INTRAMUSCULAR; INTRAVENOUS; SUBCUTANEOUS at 23:44

## 2022-01-01 RX ADMIN — HYDROMORPHONE HYDROCHLORIDE 1 MG: 1 INJECTION, SOLUTION INTRAMUSCULAR; INTRAVENOUS; SUBCUTANEOUS at 21:46

## 2022-01-01 RX ADMIN — LORAZEPAM 1 MG: 2 INJECTION INTRAMUSCULAR at 06:02

## 2022-01-01 RX ADMIN — SODIUM CHLORIDE 125 ML/HR: 9 INJECTION, SOLUTION INTRAVENOUS at 19:56

## 2022-01-01 RX ADMIN — SODIUM CHLORIDE 25 ML/HR: 9 INJECTION, SOLUTION INTRAVENOUS at 13:26

## 2022-01-01 RX ADMIN — HALOPERIDOL 2 MG: 1 TABLET ORAL at 15:44

## 2022-01-01 RX ADMIN — SODIUM CHLORIDE 125 ML/HR: 9 INJECTION, SOLUTION INTRAVENOUS at 14:53

## 2022-01-01 RX ADMIN — HYDROMORPHONE HYDROCHLORIDE 1 MG: 1 INJECTION, SOLUTION INTRAMUSCULAR; INTRAVENOUS; SUBCUTANEOUS at 15:06

## 2022-01-01 RX ADMIN — LORAZEPAM 0.5 MG: 2 INJECTION INTRAMUSCULAR at 04:17

## 2022-01-01 RX ADMIN — HALOPERIDOL 2 MG: 1 TABLET ORAL at 22:47

## 2022-01-01 RX ADMIN — HYDROMORPHONE HYDROCHLORIDE 0.5 MG: 1 INJECTION, SOLUTION INTRAMUSCULAR; INTRAVENOUS; SUBCUTANEOUS at 09:43

## 2022-01-01 RX ADMIN — Medication 10 ML: at 05:54

## 2022-01-01 RX ADMIN — HYDROMORPHONE HYDROCHLORIDE 2 MG: 2 INJECTION INTRAMUSCULAR; INTRAVENOUS; SUBCUTANEOUS at 22:11

## 2022-01-01 RX ADMIN — ENOXAPARIN SODIUM 40 MG: 100 INJECTION SUBCUTANEOUS at 09:43

## 2022-01-01 RX ADMIN — HYDROMORPHONE HYDROCHLORIDE 1 MG: 1 INJECTION, SOLUTION INTRAMUSCULAR; INTRAVENOUS; SUBCUTANEOUS at 00:49

## 2022-01-01 RX ADMIN — Medication 10 ML: at 06:37

## 2022-01-01 RX ADMIN — HYDROMORPHONE HYDROCHLORIDE 1 MG: 1 INJECTION, SOLUTION INTRAMUSCULAR; INTRAVENOUS; SUBCUTANEOUS at 13:13

## 2022-01-01 RX ADMIN — SODIUM CHLORIDE 150 ML/HR: 9 INJECTION, SOLUTION INTRAVENOUS at 05:26

## 2022-01-01 RX ADMIN — OXYCODONE 5 MG: 5 TABLET ORAL at 01:40

## 2022-01-01 RX ADMIN — SODIUM CHLORIDE 150 ML/HR: 9 INJECTION, SOLUTION INTRAVENOUS at 23:40

## 2022-01-01 RX ADMIN — HALOPERIDOL 2 MG: 1 TABLET ORAL at 09:43

## 2022-01-01 RX ADMIN — HYDROMORPHONE HYDROCHLORIDE 1 MG: 1 INJECTION, SOLUTION INTRAMUSCULAR; INTRAVENOUS; SUBCUTANEOUS at 10:35

## 2022-01-01 RX ADMIN — HYDROMORPHONE HYDROCHLORIDE 1 MG: 1 INJECTION, SOLUTION INTRAMUSCULAR; INTRAVENOUS; SUBCUTANEOUS at 08:39

## 2022-01-01 RX ADMIN — LORAZEPAM 1 MG: 2 INJECTION INTRAMUSCULAR at 03:18

## 2022-01-01 RX ADMIN — HYDROMORPHONE HYDROCHLORIDE 2 MG: 2 INJECTION INTRAMUSCULAR; INTRAVENOUS; SUBCUTANEOUS at 00:16

## 2022-02-10 NOTE — NURSE NAVIGATOR
[address]  Breast Navigator Encounter    Name: Palmer Junior  Age: 79 y.o.  : 1955  Diagnosis: Breast cancer    Encounter type:  [x]Initial Navigator Encounter  []Patient Initiated  []Navigator Follow-up []Pre-op  []Post-op []Check-in Prior to First Treatment []Treatment Modality Change   []Other:     Narrative:   Called pt to introduce self/role of Breast Navigator and to discuss upcoming appointment with Dr. Curtis Dsouza. Ms. Tracy Fernandez explained that she is seeing Dr. Curtis Dsouza per Dr. Moni Matson d/t persistent cough and SOB as well as a dx of breast cancer. She states that Dr. Moni Matson had mentioned needing scans to further evaluate the extent of disease. Pt states she is undecided about getting these tests done as she believes they are dangerous. I explained the importance of the imaging studies in order to formulate an appropriate treatment plan. She verbalizes understanding and will think about this. Contact info given and pt encouraged to reach out as needed.           SHILPA TolbertN, RN, [facility]  Breast Cancer Nurse Navigator    [address]  [address] [place] [address] [address]  W: [phone]  F: [phone]  [email or web address]   Good Help to Those in [facility]
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
## 2022-02-16 NOTE — PROGRESS NOTES
[facility] Lab Visit:    [id number] Pt arrived ambulatory and in no distress,  urine collected per   tech. Departed [facility] ambulatory and in no distress. There were no vitals taken for this visit. Labs available in CC once resulted.

## 2022-02-16 NOTE — PROGRESS NOTES
Oncology Social Work  Psychosocial Assessment    Reason for Assessment:      [] Social Work Referral [x] Initial Assessment [x] New Diagnosis [] Other    Advance Care Planning:  Advance Care Planning 10/26/2018   Confirm Advance Directive None       Sources of Information:    [x]Patient  []Family  [x]Staff  [x]Medical Record    Mental Status:    [x]Alert  []Lethargic  []Unresponsive   [] Unable to assess   Oriented to:  [x]Person  [x]Place  [x]Time  [x]Situation      Relationship Status:  [x]Single  []  []Significant Other/Life Partner  []  []  []    Living Circumstances:  [x]Lives Alone  []Family/Significant Other in Household  []Roommates  []Children in the Home  []Paid Caregivers  []Assisted Living Facility/Group [address]  []Homeless  []Incarcerated  []Environmental/Care Concerns  []Other:    Employment Status:  []Employed Full-time [x]Employed Part-time []Homemaker  [] Retired [] Short-Term Disability [] Long-Term Disability  [] Unemployed   [] [place]   [] SSDI  [] Self-Employment    Barriers to Learning:    []Language  []Developmental  []Cognitive  []Altered Mental Status  []Visual/Hearing Impairment  []Unable to Read/Write  []Motivational  [] Challenges Understanding Medical Jargon [x]No Barriers Identified      Financial/Legal Concerns:    []Uninsured  []Limited Income/Resources  []Non-Citizen  []Food Insecurity [x]No Concerns Identified   []Other:    Latter-day/Spiritual/Existential:  Does patient have any spiritual or Confucianism beliefs? [x] Yes [] No  Is the patient involved in a spiritual, tawny or Confucianism community?  [x] Yes [] No  Patient expressing spiritual/existential angst? [] Yes [x] No  Notes: Jewish ([address])    Support System:    Identified Support Person/Group: Son (Kera Barnard) & Sister Junior Saunders)  []Strong  [x]Fair  []Limited    Coping with Illness:   [x]  Coping Well  [] Challenges Coping with Serious Illness [] Situational Depression [] Situational Anxiety [] Anticipatory Grief  [] Recent Loss [] Caregiver [place]            Narrative: Patient here for consultation with Dr. Aurea Valerio for the management of breast cancer. Met with patient to introduce social work role and supports. Patient lives alone in her private residence with her 2 cats and 2 foster cats. 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 Patient tells me she has Medicare A&B only but not insurance on file. She has a son (Jose Cruz Banerjee) and sister Brennon Choudhury) who live locally and are her primary support. [employer] is very important to her and she attend [employer]. Patient denies a history of depression, anxiety or any mental health diagnosis. She endorsed recently feeling depressed due to mobility issues. Patient is currently using crutches as she has sprain her ankle several times during the last several months. Her follow-up appointment with Ortho is  On 2/21. Encouraged her to inquire about PT during that appointment. Reviewed barriers to care and patient is skeptical of medications and procedures. She voiced that treatment she has utilized have often left her feelings worse off. Patient reports she is open to discussing breast cancer treatment options. Plan: Provided her with new patient resources folder and encouraged her to contact me as needed for ongoing psychosocial support.      Referral/Handouts:   Complementary therapies referral  Caregiver Support referral  Support Groups referral    Thank you,   Isela Zuniga LCSW

## 2022-02-17 NOTE — TELEPHONE ENCOUNTER
[address] at [facility]  [phone]    02/17/2022 at 11:31 am--This user attempted to call patient to let her know that her scans have been scheduled, but she did not answer, so a message was left for her to call the office back and office number was provided. Patient's CT has been scheduled for tomorrow 02/18 at 5:00 pm at [facility], no solid foods 4 hours prior. Patient's bone scan has been scheduled for 02/22 at [facility] arriving at 10:30 am and injection at 11:00 am and the scan at 2:00 pm. [facility] building register at outpatient registration. Will relay this information to patient when she calls back.

## 2022-02-17 NOTE — TELEPHONE ENCOUNTER
[address] at [place]  [phone]    02/17/2022--This user received a phone call from patient returning my phone call from earlier, I let her know about her CT scan and also about her Bone Scan and when her appointment with Deepti Love is next week. Patient verbalized understanding and did a read back on everything and had no question's or concerns at that time.
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
## 2022-02-23 NOTE — LETTER
2/23/2022 11:55 AM    Ms. Danyel Reeves 70.      To Whom it May Concern:    Ms. Evita Restrepo is under our care for the management and treatment of breast cancer and sensitive to many products and we recommend that she does not have pesticides in her apartment. If you have any questions, please contact our office.             Sincerely,          Shandra Call MD

## 2022-02-23 NOTE — PROGRESS NOTES
Arjun Dunn is a 79 y.o. female Follow up for the evaluation of breast cancer. 1. Have you been to the ER, urgent care clinic since your last visit? Hospitalized since your last visit? No    2. Have you seen or consulted any other health care providers outside of the [address] [place] [address] since your last visit? Include any pap smears or colon screening.  No

## 2022-02-23 NOTE — PROGRESS NOTES
[address]  Oncology Social Work  Encounter     Patient Name:  Glory Preston     Medical History: breast cancer    Advance Directives: none on file; conversation deferred    Narrative: Patient has Medicare A&B only. Provided information about the Financial Assistance Program. Assisted patient with application. Advised patient will need to bring copy of Social Security Letter, Pension Letter, and 30 day bank statements for all accounts. She voiced understanding. Barriers to Care: underinsured     Plan:  1. Complete FAP application.      Referral/Handouts:   Financial/Medication assistance referral     Thank you,  Dejon Serrano LCSW

## 2022-02-25 PROBLEM — C50.919 METASTATIC BREAST CANCER (HCC): Status: ACTIVE | Noted: 2022-01-01

## 2022-02-25 NOTE — TELEPHONE ENCOUNTER
[address] at [facility]  [phone]    02/25/2022 at 2:10 pm--This user attempted to call the patient but she did not answer, message was left for patient to call our office back along with office phone number.

## 2022-02-28 NOTE — TELEPHONE ENCOUNTER
[address] at [facility]  [phone]    02/28/2022--This user received a phone call from patient's son Bekah Caruso (He was returning my phone call from Friday) in regards to patient's Echo being cancelled, he stated that he was confused, as patient had her Doppler done last week but to his knowledge he or patient did not cancel the appointment and it is still scheduled for tomorrow. I let him know that in our system from our end it says that it is cancelled but that we would follow up on it just to be sure and then give him a call back. Junious verbalized understanding and did not have any question's at that time. He is also on patient's PHI form.

## 2022-03-02 PROBLEM — C79.9 METASTATIC CANCER (HCC): Status: ACTIVE | Noted: 2022-01-01

## 2022-03-02 NOTE — PROGRESS NOTES
Daysi Murrieta is a 79 y.o. female Follow up for the evaluation of breast cancer. 1. Have you been to the ER, urgent care clinic since your last visit? Hospitalized since your last visit? No    2. Have you seen or consulted any other health care providers outside of the [address] [place] [address] since your last visit? Include any pap smears or colon screening.  No

## 2022-03-02 NOTE — PROGRESS NOTES
[facility] - Chemotherapy Progress Note    6424 Pt admit to [facility] for C1D1 HP via WC in stable condition. Assessment completed. Pt reports multiple recent falls, abdominal pain x 3 months and frequent constipation. Pt saw provider prior to today's appt. PIV with positive blood return. Provider notified of critical lab results. Provider down to see pt and son at bedside. Per MD, once treatment is complete, pt is to go to ED via ambulance. Pt is antsy and repeatedly asking to reposition. She is able to answer orientation questions correctly, but is acting inappropriately and making confused requests. Short tern memory seemed altered. Chemotherapy Flowsheet 3/2/2022   Cycle C1D1   Date 3/2/2022   Drug / Regimen HP         VS  Patient Vitals for the past 12 hrs:   Temp Pulse Resp BP SpO2   03/02/22 1715  (!) 107  134/82    03/02/22 1539 99 °F (37.2 °C)       03/02/22 1215 (!) 96.5 °F (35.8 °C)  18 137/84    03/02/22 1211 98.7 °F (37.1 °C) (!) 105 20  92 %         Medications:  Medications Administered     0.9% sodium chloride infusion     Admin Date  03/02/2022 Action  New Bag Dose  25 mL/hr Rate  25 mL/hr Route  IntraVENous Administered By  Ulysses Lush, RN          pertuzumab (PERJETA) 840 mg in 0.9% sodium chloride 250 mL, overfill volume 25 mL IVPB     Admin Date  03/02/2022 Action  New Bag Dose  840 mg Rate  303 mL/hr Route  IntraVENous Administered By  Ulysses Lush, RN          trastuzumab-qyyp (TRAZIMERA) 494 mg in 0.9% sodium chloride 250 mL, overfill volume 25 mL IVPB     Admin Date  03/02/2022 Action  New Bag Dose  494 mg Rate  199 mL/hr Route  IntraVENous Administered By  Ulysses Lush, RN              Report called to ED nurse, Antonella to inform that pt was being transported to ED via ambulance wit PIV in place.  Nurse was notified that pt completed Perjeta infusion at 1640 and should not receive additional meds (other than NS) for 1 hour following (reiterated that ok to give additional meds after 1740). 1720 Pt tolerated treatment well. PIV maintained positive blood return throughout treatment, flushed with positive blood return at conclusion. D/c to ER via ambulance with son. Family aware of next appointment scheduled for 3/23. Labs  Recent Results (from the past 12 hour(s))   CBC WITH AUTOMATED DIFF    Collection Time: 03/02/22 12:28 PM   Result Value Ref Range    WBC 9.3 3.6 - 11.0 K/uL    RBC 4.70 3.80 - 5.20 M/uL    HGB 12.3 11.5 - 16.0 g/dL    HCT 39.0 35.0 - 47.0 %    MCV 83.0 80.0 - 99.0 FL    MCH 26.2 26.0 - 34.0 PG    MCHC 31.5 30.0 - 36.5 g/dL    RDW 15.2 (H) 11.5 - 14.5 %    PLATELET 986 680 - 738 K/uL    MPV 11.1 8.9 - 12.9 FL    NRBC 0.0 0  WBC    ABSOLUTE NRBC 0.00 0.00 - 0.01 K/uL    NEUTROPHILS 84 (H) 32 - 75 %    LYMPHOCYTES 7 (L) 12 - 49 %    MONOCYTES 8 5 - 13 %    EOSINOPHILS 0 0 - 7 %    BASOPHILS 0 0 - 1 %    IMMATURE GRANULOCYTES 1 (H) 0.0 - 0.5 %    ABS. NEUTROPHILS 7.8 1.8 - 8.0 K/UL    ABS. LYMPHOCYTES 0.7 (L) 0.8 - 3.5 K/UL    ABS. MONOCYTES 0.7 0.0 - 1.0 K/UL    ABS. EOSINOPHILS 0.0 0.0 - 0.4 K/UL    ABS. BASOPHILS 0.0 0.0 - 0.1 K/UL    ABS. IMM. GRANS. 0.1 (H) 0.00 - 0.04 K/UL    DF SMEAR SCANNED      RBC COMMENTS ANISOCYTOSIS  1+       METABOLIC PANEL, COMPREHENSIVE    Collection Time: 03/02/22 12:28 PM   Result Value Ref Range    Sodium 134 (L) 136 - 145 mmol/L    Potassium 4.2 3.5 - 5.1 mmol/L    Chloride 97 97 - 108 mmol/L    CO2 27 21 - 32 mmol/L    Anion gap 10 5 - 15 mmol/L    Glucose 72 65 - 100 mg/dL    BUN 17 6 - 20 MG/DL    Creatinine 0.60 0.55 - 1.02 MG/DL    BUN/Creatinine ratio 28 (H) 12 - 20      GFR est AA >60 >60 ml/min/1.73m2    GFR est non-AA >60 >60 ml/min/1.73m2    Calcium 13.4 (HH) 8.5 - 10.1 MG/DL    Bilirubin, total 0.8 0.2 - 1.0 MG/DL    ALT (SGPT) 169 (H) 12 - 78 U/L    AST (SGOT) 811 (H) 15 - 37 U/L    Alk.  phosphatase 483 (H) 45 - 117 U/L    Protein, total 6.2 (L) 6.4 - 8.2 g/dL    Albumin 2.7 (L) 3.5 - 5.0 g/dL    Globulin 3.5 2.0 - 4.0 g/dL    A-G Ratio 0.8 (L) 1.1 - 2.2     HEP B SURFACE AG    Collection Time: 03/02/22 12:28 PM   Result Value Ref Range    Hepatitis B surface Ag <0.10 Index    Hep B surface Ag Interp. Negative NEG     HEP B SURFACE AB    Collection Time: 03/02/22 12:28 PM   Result Value Ref Range    Hepatitis B surface Ab <3.10 mIU/mL    Hep B surface Ab Interp.  NONREACTIVE NR
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
## 2022-03-03 NOTE — ACP (ADVANCE CARE PLANNING)
Advance Care Planning     Advance Care Planning (ACP) Physician/NP/PA Conversation      Date of Conversation: 3/2/2022   Diagnosis: metastatic breast Cancer    Conducted with: Son, sister, palliative care    Healthcare Decision Maker:   No healthcare decision makers have been documented. Click here to complete [address] including selection of the Healthcare Decision Maker Relationship (ie \"Primary\")        Care Preferences:    Hospitalization: \"If your health worsens and it becomes clear that your chance of recovery is unlikely, what would be your preference regarding hospitalization? \"  Will wait to see if hypercalcemia will improve    Ventilation: \"If you were unable to breathe on your own and your chance of recovery was unlikely, what would be your preference about the use of a ventilator (breathing machine) if it was available to you? \"   Full code, but will consider DNR    Resuscitation: \"In the event your heart stopped as a result of an underlying serious health condition, would you want attempts to be made to restart your heart, or would you prefer a natural death? \"   Full Code    Conversation Outcomes / Follow-Up Plan:   Discussed with patient's son and sister regarding current medical issues, prognosis, and treatment. Patient is currently Full Code, but son will consider DNR soon. Palliative care team was also present. We spoke about the patient's poor prognosis, pain and suffering, poor quality of life. We recommended hospice care.   Family will wait to speak to Oncology team before deciding    Length of Voluntary ACP Conversation in minutes:  20 minutes    Cedric Suazo MD

## 2022-03-03 NOTE — TELEPHONE ENCOUNTER
[facility]   Oncology Social Work  Encounter     Patient Name:  Asa Govea    Medical History: metastatic breast cancer    Advance Directives: none on file; conversation deferred    Narrative: Spoke with patient's son (Arabella Cade) on the phone to discuss support resources. He tell me he just learned of his mother's diagnosis last week which was very surprising. Patient has rapidly declined over the last month. She is currently living alone in an apartment and patient must go up a flight of stairs to access her unit. Stephanie Cardenas, his wife and their 10 y/o live about 12 minutes away. They both work full-time and are concerned she will request more care then they can provide due to work responsibilities. Discussed options including long-term Medicaid and self-pay private duty care. Will reach out to Bette Van NP to inquire if patient is would be eligible for [employer] screening. Barriers to Care: financial, caregiver strain     Plan:  1.  Discussed caregiver resources     Referral/Handouts:   Dependent Care referral    Thank you,  Td Negrete LCSW

## 2022-03-03 NOTE — WOUND CARE
Wound Consult:  New Patient Visit. Chart reviewed. Consulted for right breast lesion; patient presents with Stage 4 breast cancer with lesion coming through skin. Spoke with patients nurse, Mee Malone to hand off on visit. Patient is resting on a Justin bed with hercules mattress. Heels off loaded currently by patient who can move gingerly from side to side with assist of one. Patient is alert, she reports pain in back/breast; family members at bedside, Rajesh score 14; Pure wick in use, changed pad/gown due to urinary incontinence; advanced cancer risk; nutritional risk. Assessment:  Right breast - ~ 5 x 8 x 0.1 cm skin lesion, cancerous. No active bleeding currently and no odor, yellow base 90%, 10% red, drainage small serosanguinous, breast tissue surrounding firm/large, no erythema. Malignancy. Left hip - blanching erythema when turned off this side; after remaining on right side, reassessed and area had faded. No redness was noted to sacrum, buttocks, heels, spine or right hip; however patient side lying 90 degree/fetal position of side for comfort. Adding FRANCIE blower to bed. Treatment:  Multiple layers of Xeroform gauze to breast lesion, secured with sacral foam dressing. Wound Recommendations:  Multiple layers of Xeroform gauze to breast lesion, secure with sacral foam dressing; change every three days and as needed. Skin Care / PI Prevention Recommendations:  1. Minimize friction/shear: minimize layers of linen/pads under patient. 2. Off load pressure/reposition:  turn and reposition approximately every 2 hours; float heels with pillows or use off loading heel boots if needed; FRANCIE blower to bed. 3. Manage Moisture - keep skin folds dry; incontinence skin care with incontinence wipes; add barrier ointment if needed; appropriate sized briefs if needed; Pure wick in use to help contain urine. 4. Continue to monitor nutrition, pain, and skin risk scale, and skin assessment.   Plan:  Spoke with  Do regarding findings and proposed orders for treatment. We will continue to reassess weekly and as needed. Please re-consult should concerns arise despite continued skin/PI prevention measures.     Azzie Gilford, MSN, RN, [facility], [facility] / [address] [facility] Office [phone]

## 2022-03-03 NOTE — PROGRESS NOTES
[phone] phone call to ER to get phone report from [address] [place] [address]. Patient with IVF NS at 150cc/h; drowsy but states name, , on O2 3LPM nc, medical patient not on telemetry at this time. Purewick though allergies noted, per [facility]. Medicated with norco for R breast pain 4/10. R breast wound care/dressing placed by day shift per [facility]. Patient had Ca+ levels 13.4, then at 1800 was 12.7, per report. Given Zometa IVPB previously; and later calcitonin given by [facility]. Palliative consult placed.

## 2022-03-03 NOTE — ED NOTES
TRANSFER - OUT REPORT:    Verbal report given to Rory Awad RN(name) on Yoli Her  being transferred to [facility](unit) for routine progression of care       Report consisted of patients Situation, Background, Assessment and   Recommendations(SBAR). Information from the following report(s) SBAR, Kardex, ED Summary, Intake/Output, MAR and Recent Results was reviewed with the receiving nurse. Lines:   Peripheral IV 03/02/22 Right;Distal Forearm (Active)   Site Assessment Clean, dry, & intact 03/02/22 1827   Phlebitis Assessment 0 03/02/22 1827   Infiltration Assessment 0 03/02/22 1827   Dressing Status Clean, dry, & intact 03/02/22 1827   Dressing Type Transparent 03/02/22 1827   Hub Color/Line Status Pink 03/02/22 1827   Action Taken Blood drawn 03/02/22 1200   Alcohol Cap Used Yes 03/02/22 1200       Peripheral IV 03/02/22 Right Antecubital (Active)       Peripheral IV 03/02/22 Left Antecubital (Active)   Site Assessment Clean, dry, & intact 03/02/22 1826   Phlebitis Assessment 0 03/02/22 1826   Infiltration Assessment 0 03/02/22 1826   Dressing Status Clean, dry, & intact 03/02/22 1826   Dressing Type Transparent 03/02/22 1826   Hub Color/Line Status Pink 03/02/22 1826        Opportunity for questions and clarification was provided.

## 2022-03-03 NOTE — PROGRESS NOTES
Reason for Admission:   Metastatic cancer                  RUR Score:     18%             PCP: First and Last name:   Caden Pinedo PA-C   Name of Practice:    Are you a current patient: Yes/No:    Approximate date of last visit:    Can you participate in a virtual visit if needed:     Do you (patient/family) have any concerns for transition/discharge? Ability to care for patient              Plan for utilizing home health:   No prior home health history    Current Advanced Directive/Advance Care Plan:  Full Code; does not have an AMD    Healthcare Decision Maker:   Parminder Mahajan, son - [phone]           Transition of Care Plan:        Initial assessment was completed with patient's son, Carolee Nielson. Palliative NP, Temitope Arauz, and patient's sister, Cherelle Guardian ([phone]), were also present for a portion of the assessment. Up until about a week ago, patient had been residing alone in an apartment with multiple stairs and no elevator access. She has since been staying with her son and his wife (address: [address] [place] [address], [facility]) where she has been staying on the first floor (7 steps to enter the home). Son is patient's only child. He works primarily from home but is a salesman and has outside appointments. His wife and patient's sister also work. Patient has been mainly bedbound. She requires assistance with all ADLs. Patient's niece has been providing some assistance but has difficulty getting patient out of the bed. Patient has a bsc and a wheelchair. Preferred pharmacy is [facility]. 1. CM to follow and assist with discharge needs  2. Oncology following  3. Palliative Care consulted to discuss goals of care  4.  Discharge: TBD     Care Management Interventions  Support Systems: Child(ivone),Other Family Member(s)  The Plan for Transition of Care is Related to the Following Treatment Goals : Metastatic cancer  Discharge Location  Patient Expects to be Discharged to[de-identified] Unable to determine at this time     Noah Francisco LCSW

## 2022-03-03 NOTE — PROGRESS NOTES
BEDSIDE_VERBAL_RECORDED_WRITTEN:86524::\"Bedside\"} shift change report given to Marcial Yanes (oncoming nurse) by Jessenia Aranda  (offgoing nurse). Report included the following information SBAR, Kardex, ED Summary, Procedure Summary, Intake/Output, MAR, Recent Results and Med Rec Status; unsuccessful at drawing labs; pain management.

## 2022-03-03 NOTE — PROGRESS NOTES
[facility] [address]  [address]  [phone]      Medical Progress Note      NAME: Sarah Griggs   :  1955  MRM:  [id number]    Date/Time of service: 3/3/2022  1:23 PM       Subjective:     Chief Complaint:  Patient was personally seen and examined by me during this time period. Chart reviewed. Confused, c/o diffused pain       Objective:       Vitals:       Last 24hrs VS reviewed since prior progress note. Most recent are:    Visit Vitals  BP (!) 146/80   Pulse (!) 110   Temp 98.8 °F (37.1 °C)   Resp 18   Ht 5' 6\" (1.676 m)   Wt 61 kg (134 lb 6.4 oz)   SpO2 90%   BMI 21.69 kg/m²     SpO2 Readings from Last 6 Encounters:   22 90%   22 92%   22 92%   22 95%   22 99%   19 98%    O2 Flow Rate (L/min): 3 l/min       Intake/Output Summary (Last 24 hours) at 3/3/2022 1323  Last data filed at 3/3/2022 0700  Gross per 24 hour   Intake 3325 ml   Output 300 ml   Net 3025 ml        Exam:     Physical Exam:    Gen:  Disheveled, ill-appearing, mild distress  HEENT:  Pink conjunctivae, PERRL, hearing intact to voice, moist mucous membranes  Neck:  Supple, without masses, thyroid non-tender  Resp:  No accessory muscle use, clear breath sounds without wheezes rales or rhonchi  Card:  No murmurs, normal S1, S2 without thrills, bruits or peripheral edema  Abd:  Soft, non-tender, non-distended, normoactive bowel sounds are present  Musc:  No cyanosis or clubbing  Skin:  Large breast wound (see wound care note)  Neuro:   Moves all ext  Psych:  No insight    Medications Reviewed: (see below)    Lab Data Reviewed: (see below)    ______________________________________________________________________    Medications:     Current Facility-Administered Medications   Medication Dose Route Frequency    sodium chloride (NS) flush 5-40 mL  5-40 mL IntraVENous Q8H    sodium chloride (NS) flush 5-40 mL  5-40 mL IntraVENous PRN    acetaminophen (TYLENOL) tablet 650 mg  650 mg Oral Q6H PRN    Or    acetaminophen (TYLENOL) suppository 650 mg  650 mg Rectal Q6H PRN    polyethylene glycol (MIRALAX) packet 17 g  17 g Oral DAILY PRN    ondansetron (ZOFRAN ODT) tablet 4 mg  4 mg Oral Q8H PRN    Or    ondansetron (ZOFRAN) injection 4 mg  4 mg IntraVENous Q6H PRN    enoxaparin (LOVENOX) injection 40 mg  40 mg SubCUTAneous DAILY    0.9% sodium chloride infusion  125 mL/hr IntraVENous CONTINUOUS    HYDROmorphone (DILAUDID) injection 1 mg  1 mg IntraVENous Q3H PRN    oxyCODONE IR (ROXICODONE) tablet 5 mg  5 mg Oral Q4H PRN          Lab Review:     Recent Labs     03/03/22  1138 03/02/22  1753 03/02/22  1228   WBC 8.3 6.0 9.3   HGB 10.2* 11.8 12.3   HCT 32.9* 36.9 39.0   * 151 201     Recent Labs     03/03/22  1138 03/02/22  1753 03/02/22  1228    136 134*   K 3.6 3.9 4.2    101 97   CO2 25 26 27   GLU 76 75 72   BUN 17 18 17   CREA 0.50* 0.69 0.60   CA 9.5 12.7* 13.4*   MG 2.0 2.0  --    PHOS 2.1* 2.8  --    ALB  --  2.4* 2.7*   TBILI  --  1.1* 0.8   ALT  --  156* 169*     No results found for: GLUCPOC       Assessment / Plan:     78 yo hx of metastatic breast CA, extensive bone metastasis, presented w/ AMS, hypercalcemia    1) Hypercalcemia: Ca 14 on admission. S/p Zometa. Cont IVF, calcitonin. Defer management to Oncology    2) Acute met encephalopathy: due to hypercalcemia. Will monitor for agitation    3) Metastatic breast CA w/ bone mets/acute cancer pain: poor prognosis. Likely has weeks to live. Will continue to discuss with family and oncology about goals of care. Cont IV dilaudid prn severe pain    4) Elevated LFTs/lactate: due to liver mets.   Monitor prn     Total time spent with patient: 35 min **I personally saw and examined the patient during this time period**                 Care Plan discussed with: Patient, nursing, family, palliative care    Discussed:  Care Plan    Prophylaxis:  Lovenox    Disposition:  hospice ___________________________________________________    Attending Physician: Martina Leal MD
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
## 2022-03-03 NOTE — PROGRESS NOTES
Galindo call to this author from [employer] who requested that when this Osbaldo Borer draws patient's labs to please draw a dark green top tube and call Drake Darby. Author just now unsuccessful x2 attempts at peripheral stick (posterior hand lateral and medial sites) at obtaining blood draws at this time. Drake Darby has been notified.
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
## 2022-03-03 NOTE — PROGRESS NOTES
Spiritual Care Assessment/Progress Note  [address]      NAME: Drea Lyons      MRN: [id number]  AGE: 79 y.o. SEX: female  Scientology Affiliation: No preference   Language: English     3/3/2022     Total Time (in minutes): 15     Spiritual Assessment begun in [facility]  MED SURG 2 through conversation with:         []Patient        [] Family    [] Friend(s)        Reason for Consult: Palliative Care, Initial/Spiritual Assessment     Spiritual beliefs: (Please include comment if needed)     [] Identifies with a tawny tradition:         [] Supported by a tawny community:            [] Claims no spiritual orientation:           [] Seeking spiritual identity:                [] Adheres to an individual form of spirituality:           [x] Not able to assess:                           Identified resources for coping:      [] Prayer                               [] Music                  [] Guided Imagery     [] Family/friends                 [] Pet visits     [] Devotional reading                         [x] Unknown     [] Other:                                              Interventions offered during this visit: (See comments for more details)    Patient Interventions: Initial visit           Plan of Care:     [] Support spiritual and/or cultural needs    [] Support AMD and/or advance care planning process      [] Support grieving process   [] Coordinate Rites and/or Rituals    [] Coordination with community clergy   [] No spiritual needs identified at this time   [] Detailed Plan of Care below (See Comments)  [] Make referral to Music Therapy  [] Make referral to Pet Therapy     [] Make referral to Addiction services  [] Make referral to [facility]  [] Make referral to Spiritual Care Partner  [] No future visits requested        [x] Contact Spiritual Care for further referrals     Attempted visit for palliative initial spiritual assessment. Unable to visit patient at this time.  Pt was sleeping and did not awake. No family present. Pt's chart was consulted.   Chaplain Salas MDiv, MS, [facility]
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
## 2022-03-04 NOTE — ACP (ADVANCE CARE PLANNING)
ACP Note:      Primary Decision Maker (Active): Gracy Andrews - Son - [phone]    Patient unable to make own care decisions today. She did express fear and worry about dying and leaving her family behind. Her only child, son Jesus Whiteside, is [place]. Met with him today along with patient's sister Abel Garvin and her daughter Aurea Mariscal. Discussed her current condition. They are all very much still processing the advanced nature of her disease. They are aware Hospice is being offered as an option to care for her but quickly shifted conversation from Jason" we care for her to [place]. \"  As up until the past two weeks, she was independent and didn't require a caregiver. Talked through options some, explained role of Hospice team and how they assist with caregiving at home. No clear decisions at this point. I recommended making an attempt to clear her delirium and reassessing her cognition and function on Monday.      Dr. Elvira Griffith
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
## 2022-03-04 NOTE — PROGRESS NOTES
3/4/2022  Case Management Progress Note    12:45 PM  Patient is 79year old female admitted 3/2 with metastatic cancer  Patient's RUR is 19% yellow/moderate risk for readmission  Covid test: none this admission  Chart reviewed--patient discussed at IDR rounds  Per MD at rounds patient and family are in discussions with palliative team for goals of care. She has not been able to make her own decisions and as of yet they have declined to make her a DNR. Disposition unclear at this time. Placement is likely home, but what resources patient will need/receive is unclear. Will continue to follow and assist with discharge planning. Transition of Care Plan   1. Continue medical management/treatment  2. Disposition to be determined   3. Palliative team involved for [address]   4.  CM will continue to follow    FREDI Verdin
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
## 2022-03-04 NOTE — ROUTINE PROCESS
Bedside shift change report given to [facility]  (oncoming nurse) by Zhanna Rogers  RN  (offgoing nurse). Report included the following information SBAR, Kardex, ED Summary, MAR, Recent Results and Med Rec Status.

## 2022-03-04 NOTE — PROGRESS NOTES
Brandan Eid [facility]  [address] [facility], [address] [place] [address]  [phone]      Medical Progress Note      NAME: Arjun Dunn   :  1955  MRM:  [id number]    Date/Time of service: 3/4/2022  11:27 AM       Subjective:     Chief Complaint:  Patient was personally seen and examined by me during this time period. Chart reviewed. C/o generalized pain       Objective:       Vitals:       Last 24hrs VS reviewed since prior progress note. Most recent are:    Visit Vitals  /65 (BP 1 Location: Right upper arm, BP Patient Position: At rest)   Pulse 82   Temp 97.4 °F (36.3 °C)   Resp 17   Ht 5' 6\" (1.676 m)   Wt 61 kg (134 lb 6.4 oz)   SpO2 97%   BMI 21.69 kg/m²     SpO2 Readings from Last 6 Encounters:   22 97%   22 92%   22 92%   22 95%   22 99%   19 98%    O2 Flow Rate (L/min): 3 l/min       Intake/Output Summary (Last 24 hours) at 3/4/2022 1127  Last data filed at 3/3/2022 2104  Gross per 24 hour   Intake 2460 ml   Output 1000 ml   Net 1460 ml        Exam:     Physical Exam:    Gen:  Disheveled, ill-appearing, mild distress  HEENT:  Pink conjunctivae, PERRL, hearing intact to voice, moist mucous membranes  Neck:  Supple, without masses, thyroid non-tender  Resp:  No accessory muscle use, clear breath sounds without wheezes rales or rhonchi  Card:  No murmurs, normal S1, S2 without thrills, bruits or peripheral edema  Abd:  Soft, non-tender, non-distended, normoactive bowel sounds are present  Musc:  No cyanosis or clubbing  Skin:  Large breast wound (see wound care note)  Neuro:   Moves all ext  Psych:  No insight    Medications Reviewed: (see below)    Lab Data Reviewed: (see below)    ______________________________________________________________________    Medications:     Current Facility-Administered Medications   Medication Dose Route Frequency    HYDROmorphone (DILAUDID) injection 1 mg  1 mg IntraVENous Q2H PRN    senna-docusate (PERICOLACE) 8.6-50 mg per tablet 1 Tablet  1 Tablet Oral BID    sodium chloride (NS) flush 5-40 mL  5-40 mL IntraVENous Q8H    sodium chloride (NS) flush 5-40 mL  5-40 mL IntraVENous PRN    acetaminophen (TYLENOL) tablet 650 mg  650 mg Oral Q6H PRN    Or    acetaminophen (TYLENOL) suppository 650 mg  650 mg Rectal Q6H PRN    polyethylene glycol (MIRALAX) packet 17 g  17 g Oral DAILY PRN    ondansetron (ZOFRAN ODT) tablet 4 mg  4 mg Oral Q8H PRN    Or    ondansetron (ZOFRAN) injection 4 mg  4 mg IntraVENous Q6H PRN    enoxaparin (LOVENOX) injection 40 mg  40 mg SubCUTAneous DAILY    0.9% sodium chloride infusion  125 mL/hr IntraVENous CONTINUOUS    oxyCODONE IR (ROXICODONE) tablet 5 mg  5 mg Oral Q4H PRN          Lab Review:     Recent Labs     03/04/22  0346 03/03/22  1138 03/02/22  1753   WBC 8.0 8.3 6.0   HGB 10.1* 10.2* 11.8   HCT 32.9* 32.9* 36.9   * 122* 151     Recent Labs     03/04/22  0346 03/03/22  1138 03/02/22  1753 03/02/22  1228 03/02/22  1228   * 138 136   < > 134*   K 3.9 3.6 3.9   < > 4.2    106 101   < > 97   CO2 23 25 26   < > 27   GLU 70 76 75   < > 72   BUN 19 17 18   < > 17   CREA 0.46* 0.50* 0.69   < > 0.60   CA 8.8 9.5 12.7*   < > 13.4*   MG 2.0 2.0 2.0  --   --    PHOS 2.6 2.1* 2.8  --   --    ALB 1.9*  --  2.4*  --  2.7*   TBILI 0.6  --  1.1*  --  0.8   *  --  156*  --  169*    < > = values in this interval not displayed. No results found for: GLUCPOC       Assessment / Plan:     78 yo hx of metastatic breast CA, extensive bone metastasis, presented w/ AMS, hypercalcemia    1) Hypercalcemia: Now resolved. Ca was 14 on admission. S/p Zometa. Cont IVF, calcitonin. Monitor Ca level. Defer management to Oncology    2) Acute met encephalopathy: slight improvement. Due to hypercalcemia. Will monitor for agitation    3) Metastatic breast CA w/ bone mets/acute cancer pain: poor prognosis. Likely has weeks to live.   Will continue to discuss with family and oncology about goals of care, code status, and hospice. Cont IV dilaudid prn severe pain    4) Elevated LFTs/lactate: trending down. Due to liver mets.   Monitor prn     Total time spent with patient: 30 min **I personally saw and examined the patient during this time period**                 Care Plan discussed with: Patient, nursing, Oncology (Dr. Tiffani Gamboa)    Discussed:  Care Plan    Prophylaxis:  Lovenox    Disposition:  hospice            ___________________________________________________    Attending Physician: Leonel Leyden, MD

## 2022-03-05 NOTE — PROGRESS NOTES
Brandan Eid [facility] [address]  [address] [facility], [address] [place] [address]  [phone]      Medical Progress Note      NAME: Lenore Araujo   :  1955  MRM:  [id number]    Date/Time of service: 3/5/2022         Subjective:     Chief Complaint:  Patient was personally seen and examined by me during this time period. Chart reviewed. Reports some pain, no dyspnea. Called son; no answer, left VM. Objective:       Vitals:       Last 24hrs VS reviewed since prior progress note. Most recent are:    Visit Vitals  BP (!) 145/65 (BP 1 Location: Left upper arm, BP Patient Position: At rest)   Pulse 99   Temp 97.9 °F (36.6 °C)   Resp 18   Ht 5' 6\" (1.676 m)   Wt 61 kg (134 lb 6.4 oz)   SpO2 96%   BMI 21.69 kg/m²     SpO2 Readings from Last 6 Encounters:   22 96%   22 92%   22 92%   22 95%   22 99%   19 98%    O2 Flow Rate (L/min): 3 l/min       Intake/Output Summary (Last 24 hours) at 3/5/2022 1438  Last data filed at 3/5/2022 1150  Gross per 24 hour   Intake 360 ml   Output 2150 ml   Net -1790 ml        Exam:     Physical Exam:    Gen: ill-appearing, NAD   HEENT:  Pink conjunctivae, PERRL, hearing intact to voice  Resp:  No accessory muscle use, clear breath sounds without wheezes rales or rhonchi  Card:  No murmurs, normal S1, S2 without thrills, bruits or peripheral edema  Abd:  Soft, non-tender, non-distended, normoactive bowel sounds are present  Musc:  No cyanosis or clubbing  Skin:  Large breast wound (see wound care note)  Neuro:   Moves all ext  Psych:  orientedx 3     Medications Reviewed: (see below)    Lab Data Reviewed: (see below)    ______________________________________________________________________    Medications:     Current Facility-Administered Medications   Medication Dose Route Frequency    haloperidoL (HALDOL) tablet 2 mg  2 mg Oral Q8H    oxyCODONE IR (ROXICODONE) tablet 10 mg  10 mg Oral Q6H    HYDROmorphone (DILAUDID) syringe 0.5 mg 0.5 mg IntraVENous Q3H    senna (SENOKOT) tablet 17.2 mg  2 Tablet Oral BID    sodium chloride (NS) flush 5-40 mL  5-40 mL IntraVENous Q8H    sodium chloride (NS) flush 5-40 mL  5-40 mL IntraVENous PRN    acetaminophen (TYLENOL) tablet 650 mg  650 mg Oral Q6H PRN    Or    acetaminophen (TYLENOL) suppository 650 mg  650 mg Rectal Q6H PRN    polyethylene glycol (MIRALAX) packet 17 g  17 g Oral DAILY PRN    ondansetron (ZOFRAN ODT) tablet 4 mg  4 mg Oral Q8H PRN    Or    ondansetron (ZOFRAN) injection 4 mg  4 mg IntraVENous Q6H PRN    enoxaparin (LOVENOX) injection 40 mg  40 mg SubCUTAneous DAILY    0.9% sodium chloride infusion  100 mL/hr IntraVENous CONTINUOUS          Lab Review:     Recent Labs     03/05/22 0220 03/04/22 0346 03/03/22  1138   WBC 8.7 8.0 8.3   HGB 10.4* 10.1* 10.2*   HCT 33.8* 32.9* 32.9*   * 135* 122*     Recent Labs     03/05/22 0220 03/04/22  0346 03/03/22  1138 03/02/22  1753 03/02/22  1753    135* 138   < > 136   K 3.6 3.9 3.6   < > 3.9    103 106   < > 101   CO2 25 23 25   < > 26   GLU 90 70 76   < > 75   BUN 10 19 17   < > 18   CREA 0.35* 0.46* 0.50*   < > 0.69   CA 8.4* 8.8 9.5   < > 12.7*   MG 1.8 2.0 2.0   < > 2.0   PHOS 1.3* 2.6 2.1*   < > 2.8   ALB  --  1.9*  --   --  2.4*   TBILI  --  0.6  --   --  1.1*   ALT  --  106*  --   --  156*    < > = values in this interval not displayed. No results found for: GLUCPOC       Assessment / Plan:     78 yo hx of metastatic breast CA, extensive bone metastasis, presented w/ AMS, hypercalcemia     Hypercalcemia: Improved. Ca was 14 on admission. S/p Zometa and calcitonin. Continue IVF. Monitor Ca level. Defer management to Oncology     Acute met encephalopathy: improved. Due to hypercalcemia. Obtain Ct head wo. Remains agitated increase haldol. Metastatic breast CA w/ bone mets/acute cancer pain: poor prognosis. Likely has weeks to live.   Will continue to discuss with family and oncology about goals of care and hospice. Patient made herself DNR today. Cont IV dilaudid prn severe pain    Elevated LFTs/lactate: trending down. Due to liver mets.   Monitor prn     Total time spent with patient: 30 min **I personally saw and examined the patient during this time period**                 Care Plan discussed with: Patient, nursing    Discussed:  Care Plan    Prophylaxis:  Lovenox    Disposition:  hospice            ___________________________________________________    Attending Physician: Kari Jimenez DO

## 2022-03-05 NOTE — ROUTINE PROCESS
Bedside shift change report given to ARLETH Mccracken  (oncoming nurse) by Humera Garcia RN  (offgoing nurse).  Report included the following information SBAR, Kardex, ED Summary, MAR, Recent Results and Med Rec Status.

## 2022-03-05 NOTE — PROGRESS NOTES
Pt very restless this afternoon, pulling off continuous pulse ox monitor repeatedly, pulling at IVs, attempting to get out of bed almost continuously (had been toileted multiple times, no apparent needs not being met). Pleasant but very difficult to redirect. MD notified, new order rec'd for sitter.

## 2022-03-05 NOTE — ACP (ADVANCE CARE PLANNING)
Advance Care Planning (ACP) Provider Note - Comprehensive      Date of ACP Conversation: 03/05/22    Persons included in Conversation:  patient   Length of ACP Conversation in minutes:  16 minutes     Authorized Decision Maker (if patient is incapable of making informed decisions):   Patient oriented x4 and likely able to make her own decisions        General ACP for ALL Patients with Decision Making Capacity:  Importance of advance care planning, including choosing a healthcare agent to communicate patient's healthcare decisions if patient lost the ability to make decisions. Active Diagnoses: All the below   Patient Active Problem List   Diagnosis Code    Breast cancer, right ([facility] C50.911    Metastatic breast cancer ([facility] C50.919    [facility] C79.9         These active diagnoses are of sufficient risk that focused discussion on advance care planning is indicated in order to allow the patient to thoughtfully consider personal goals of care; and, if situations arise that prevent the ability to personally give input, to ensure appropriate representation of their personal desires for different levels and aggressiveness of care. For Serious or Chronic Illness:  Reviewed poor prognosis of breast cancer. Patient states she would like to be DNR. She states she wants no chest compressions or CPR and would want to be let go if her heart stops on its own. She again states she is not intersted in chemotherapy but is interested in HER 2 directed therapy. Her son is her decision maker if she cant make her own.      Interventions Provided:  Code Status changed to DNR

## 2022-03-06 NOTE — PROGRESS NOTES
Brandan Eid [facility] [address]  [address] [place] [address]  [phone]      Medical Progress Note      NAME: Julio Cesar Humphries   :  1955  MRM:  [id number]    Date/Time of service: 3/6/2022         Subjective:     Chief Complaint:  Patient was personally seen and examined by me during this time period. Chart reviewed. Appears dyspneic and labored; notes dyspnea, no cp or pain currently. Objective:       Vitals:       Last 24hrs VS reviewed since prior progress note. Most recent are:    Visit Vitals  /68 (BP 1 Location: Right upper arm, BP Patient Position: At rest)   Pulse (!) 104   Temp 97.8 °F (36.6 °C)   Resp 10   Ht 5' 6\" (1.676 m)   Wt 61 kg (134 lb 6.4 oz)   SpO2 96%   BMI 21.69 kg/m²     SpO2 Readings from Last 6 Encounters:   22 96%   22 92%   22 92%   22 95%   22 99%   19 98%    O2 Flow Rate (L/min): 2 l/min       Intake/Output Summary (Last 24 hours) at 3/6/2022 1012  Last data filed at 3/6/2022 0500  Gross per 24 hour   Intake 1300 ml   Output 1550 ml   Net -250 ml        Exam:     Physical Exam:    Gen: ill-appearing, some distress   HEENT:  Pink conjunctivae, PERRL, hearing intact to voice  Resp:  Some accessory muscle use, rhonchi b/l   Card:  No murmurs, normal S1, S2 without thrills, bruits or peripheral edema  Abd:  Soft, non-tender, non-distended, normoactive bowel sounds are present  Musc:  No cyanosis or clubbing  Skin:  Large breast wound (see wound care note)  Neuro:   Moves all ext  Psych:  Oriented x3     Medications Reviewed: (see below)    Lab Data Reviewed: (see below)    ______________________________________________________________________    Medications:     Current Facility-Administered Medications   Medication Dose Route Frequency    albuterol-ipratropium (DUO-NEB) 2.5 MG-0.5 MG/3 ML  3 mL Nebulization Q6H RT    haloperidoL (HALDOL) tablet 2 mg  2 mg Oral Q8H    oxyCODONE IR (ROXICODONE) tablet 10 mg 10 mg Oral Q6H    HYDROmorphone (DILAUDID) syringe 0.5 mg  0.5 mg IntraVENous Q3H    senna (SENOKOT) tablet 17.2 mg  2 Tablet Oral BID    sodium chloride (NS) flush 5-40 mL  5-40 mL IntraVENous Q8H    sodium chloride (NS) flush 5-40 mL  5-40 mL IntraVENous PRN    acetaminophen (TYLENOL) tablet 650 mg  650 mg Oral Q6H PRN    Or    acetaminophen (TYLENOL) suppository 650 mg  650 mg Rectal Q6H PRN    polyethylene glycol (MIRALAX) packet 17 g  17 g Oral DAILY PRN    ondansetron (ZOFRAN ODT) tablet 4 mg  4 mg Oral Q8H PRN    Or    ondansetron (ZOFRAN) injection 4 mg  4 mg IntraVENous Q6H PRN    enoxaparin (LOVENOX) injection 40 mg  40 mg SubCUTAneous DAILY    [Held by provider] 0.9% sodium chloride infusion  100 mL/hr IntraVENous CONTINUOUS          Lab Review:     Recent Labs     03/06/22 0228 03/05/22 0220 03/04/22  0346   WBC 8.7 8.7 8.0   HGB 10.7* 10.4* 10.1*   HCT 34.2* 33.8* 32.9*    147* 135*     Recent Labs     03/06/22 0228 03/05/22  0220 03/04/22 0346 03/03/22  1138 03/03/22  1138    136 135*   < > 138   K 3.4* 3.6 3.9   < > 3.6    103 103   < > 106   CO2 23 25 23   < > 25   * 90 70   < > 76   BUN 8 10 19   < > 17   CREA 0.34* 0.35* 0.46*   < > 0.50*   CA 8.0* 8.4* 8.8   < > 9.5   MG  --  1.8 2.0  --  2.0   PHOS  --  1.3* 2.6  --  2.1*   ALB 1.9*  --  1.9*  --   --    TBILI 0.7  --  0.6  --   --    ALT 82*  --  106*  --   --     < > = values in this interval not displayed. No results found for: GLUCPOC       Assessment / Plan:     80 yo hx of metastatic breast CA, extensive bone metastasis, presented w/ AMS, hypercalcemia    Dyspnea: Currently on NC. check CXR and start nebs. Stop IVF. Monitor      Hypercalcemia: Improved. Ca was 14 on admission. S/p Zometa and calcitonin. Stop IVF due to above. Monitor Ca level. Defer management to Oncology     Acute met encephalopathy: improved. Due to hypercalcemia. Obtain Ct head wo. Haldol for agitation.      Metastatic breast CA w/ bone mets/acute cancer pain: poor prognosis. Likely has weeks to live. Will continue to discuss with family and oncology about goals of care and hospice. Patient made herself DNR 3/05/22. Cont IV dilaudid prn severe pain    Elevated LFTs/lactate: trending down. Due to liver mets.   Monitor prn     Total time spent with patient: 30 min **I personally saw and examined the patient during this time period**                 Care Plan discussed with: Patient, nursing    Discussed:  Care Plan    Prophylaxis:  Lovenox    Disposition:  hospice            ___________________________________________________    Attending Physician: Alma Conrad DO

## 2022-03-06 NOTE — PROGRESS NOTES
TRANSFER - OUT REPORT:    Verbal report given to Roxanna(name) on Frieda Montiel  being transferred to [facility] (unit) for change in patient condition(hospice, symptom managment.)       Report consisted of patients Situation, Background, Assessment and   Recommendations(SBAR). Information from the following report(s) SBAR, Kardex, Intake/Output and MAR was reviewed with the receiving nurse. Lines:   Peripheral IV 03/02/22 Right Antecubital (Active)   Site Assessment Clean, dry, & intact 03/06/22 0500   Phlebitis Assessment 0 03/06/22 0500   Infiltration Assessment 0 03/06/22 0500   Dressing Status Clean, dry, & intact 03/06/22 0500   Dressing Type Transparent 03/06/22 0500   Hub Color/Line Status Yellow 03/04/22 2010   Action Taken Open ports on tubing capped 03/04/22 2010   Alcohol Cap Used Yes 03/06/22 0500       Peripheral IV 03/02/22 Left Antecubital (Active)   Site Assessment Clean, dry, & intact 03/06/22 0500   Phlebitis Assessment 0 03/06/22 0500   Infiltration Assessment 0 03/06/22 0500   Dressing Status Clean, dry, & intact 03/06/22 0500   Dressing Type Transparent 03/06/22 0500   Hub Color/Line Status Pink 03/04/22 2010   Action Taken Open ports on tubing capped 03/04/22 2010   Alcohol Cap Used Yes 03/06/22 0500        Opportunity for questions and clarification was provided.       Patient transported with:   O2 @ 2 liters

## 2022-03-06 NOTE — HOSPICE
Hospice RN Note: pt appropriate for GIP LOC at [facility] for management of pain, dyspnea and agitation/restlessness. Pt fatigued in and out of consciousness, restless and unable to participate in discussion regarding transfer to hospice house. Call to son, Mackenzie Henriquez [phone] and he is in agreement of transfer to [facility] today. Consents/DDNR sent to [facility] via docusign awaiting signature. Rapid COVID ordered and bedside RN notified. [facility] transport requested by CM. RN to leave all PIV's intact for use at [facility]. Call report to [phone] and call [facility] when transport arrives as well.

## 2022-03-06 NOTE — PROGRESS NOTES
Spiritual Care Assessment/Progress Note  [address]      NAME: Sarah Griggs      MRN: [id number]  AGE: 79 y.o. SEX: female  Cheondoism Affiliation: No preference   Language: English     3/6/2022     Total Time (in minutes): 15     Spiritual Assessment begun in [facility] 5M1 MED SURG 1 through conversation with:         [x]Patient        [x] Family    [] Friend(s)        Reason for Consult: Request by patient     Spiritual beliefs: (Please include comment if needed)     [x] Identifies with a tawny tradition:         [] Supported by a tawny community:            [] Claims no spiritual orientation:           [] Seeking spiritual identity:                [] Adheres to an individual form of spirituality:           [] Not able to assess:                           Identified resources for coping:      [x] Prayer                               [] Music                  [] Guided Imagery     [x] Family/friends                 [] Pet visits     [] Devotional reading                         [] Unknown     [] Other:                                               Interventions offered during this visit: (See comments for more details)    Patient Interventions: Affirmation of emotions/emotional suffering,Catharsis/review of pertinent events in supportive environment,Affirmation of tawny,End of life issues discussed,Initial/Spiritual assessment, patient floor,Iconic (affirming the presence of God/Higher Power),Integration of medical assessment with existing values and beliefs,Prayer (assurance of),Prayer (actual),Cheondoism beliefs/image of God discussed,Reframing     Family/Friend(s):  Affirmation of emotions/emotional suffering,Catharsis/review of pertinent events in supportive environment,Iconic (affirming the presence of God/Higher Power),Initial Assessment,Prayer (actual),Prayer (assurance of)     Plan of Care:     [x] Support spiritual and/or cultural needs    [] Support AMD and/or advance care planning process [x] Support grieving process   [] Coordinate Rites and/or Rituals    [] Coordination with community clergy   [] No spiritual needs identified at this time   [] Detailed Plan of Care below (See Comments)  [] Make referral to Music Therapy  [] Make referral to Pet Therapy     [] Make referral to Addiction services  [] Make referral to [facility]  [] Make referral to Spiritual Care Partner  [] No future visits requested        [x] Contact Spiritual Care for further referrals     Comments: Informed that Ms Osvaldo Leary in room [phone] would like  visit; reviewed patient's chart prior to seeing her. Ms Osvaldo Leary was lying quietly in bed when  arrived. Two of patient's family members were at her bedside. Ms Osvaldo Leary appeared very drowsy and frequently dozed off during conversation. Provided spiritual presence and active listening as Ms Osvaldo Leary shared that she didn't understand why God had not healed her as she felt God would based on her understanding of Scripture. She said that she was very concerned about leaving her son, grandchildren, nieces & nephews. The family members present shared that patient was very Alevism and had refused treatment for cancer when diagnosed, believing God would heal her. Provided safe space for Ms Osvaldo Leary to process her feelings of grief and spiritual distress. Acknowledged her feelings and concerns and attempted to lead her towards re-framing;  assured her of God's presence a love. With her permission, had prayer on behalf of patient and family. Assured them of ongoing  availability for support and continued prayers on their behalf. Ms Osvaldo Leary and family expressed appreciation for visit & prayer. : . Kaylan Quintero.  Gadiel Granados; [facility], to contact [phone] [address] call: [phone]

## 2022-03-06 NOTE — PROGRESS NOTES
Order of Ionized Ca placed yesterday, blood drawn & processed but not crossing over into system.  Pt resulted value 1.18 mmol/L, reported to Orlando Beaver RN this AM. Will advise RT supervisor to trouble shoot sytem to ensure results are entered into lab results flowsheet

## 2022-03-06 NOTE — PROGRESS NOTES
Pt discharged per MD order, being transported to hospice house by [facility]. Pt in no apparent distress at time of discharge and with no complaints. DNR sent with [facility], pt report given to [facility] staff.

## 2022-03-06 NOTE — ACP (ADVANCE CARE PLANNING)
Advance Care Planning (ACP) Provider Note - Comprehensive      Date of ACP Conversation: 03/06/22    Persons included in Conversation:  patient, sister and son   Length of ACP Conversation in minutes:  16 minutes     Authorized Decision Maker (if patient is incapable of making informed decisions):   Patient oriented x3 but sister and son Shiraz Adkins also included in conversation             Active Diagnoses: All the below   Patient Active Problem List   Diagnosis Code    Breast cancer, right ([facility] C50.911    Metastatic breast cancer ([facility] C50.919    Metastatic cancer ([facility] C79.9         These active diagnoses are of sufficient risk that focused discussion on advance care planning is indicated in order to allow the patient to thoughtfully consider personal goals of care; and, if situations arise that prevent the ability to personally give input, to ensure appropriate representation of their personal desires for different levels and aggressiveness of care. For Serious or Chronic Illness:  Patient appeared very labored and uncomfortable today. Reviewed poor prognosis of breast cancer with patient, son and sister. Asked patient if she would like to pursue comfort care; she stated that she would like comfort care. Sister and son in agreement with this plan. Patient and family intersted in hospice. Explained to patient that with hospice she would stop HER2 directed teratment that was previously started; she agreed to this. Different hospice options including home, hospice house and inpatient house discussed. Family does not feel that they can take care of patient in home  hospice; they are interested in inpatient or hospice house. Patient again expressed that she wanted DNR.      Interventions Provided:  Consult placed to hospice

## 2022-03-06 NOTE — PROGRESS NOTES
Bedside shift change report given to Jah Hong (oncoming nurse) by Anitra Wood (offgoing nurse). Report included the following information SBAR, Kardex, Intake/Output, MAR and Recent Results.

## 2022-03-06 NOTE — DISCHARGE INSTRUCTIONS
HOSPITALIST DISCHARGE INSTRUCTIONS  NAME: Shabnam Nieves   :  1955   MRN:  [id number]     Date/Time:  3/6/2022 2:59 PM    ADMIT DATE: 3/2/2022     DISCHARGE DATE: 3/6/2022     ADMITTING DIAGNOSIS:  Breast Cancer    DISCHARGE DIAGNOSIS:  Breast Cancer    MEDICATIONS:    · It is important that you take the medication exactly as they are prescribed. · Keep your medication in the bottles provided by the pharmacist and keep a list of the medication names, dosages, and times to be taken in your wallet. · Do not take other medications without consulting your doctor     Pain Management: per above medications    What to do at Home    Recommended diet:  Clear liquids, advance as tolerated    Recommended activity: Activity as tolerated    1) Return to the hospital if you feel worse    2) If you experience any of the following symptoms then please call your primary care physician or return to the emergency room if you cannot get hold of your doctor:  Fever, chills, nausea, vomiting, diarrhea, change in mentation, falling, bleeding, shortness of breath, chest pain, severe headache, severe abdominal pain. Follow Up: Follow-up Information     Follow up With Specialties Details Why Contact Info    Berlin Ramos [place] Physician Assistant Schedule an appointment as soon as possible for a visit in 1 week Hospital Follow Up [facility]  [phone]              Information obtained by :  I understand that if any problems occur once I am at home I am to contact my physician. I understand and acknowledge receipt of the instructions indicated above.                                                                                                                                            Physician's or R.N.'s Signature                                                                  Date/Time Patient or Representative Signature                                                          Date/Time

## 2022-03-06 NOTE — HOSPICE
[address] [facility] Help to Those in Need  [phone]    Hospice Nursing PRE-Admission   Discharge Summary  Patient Name: Ryan Arcos  YOB: 1955  Age: 79 y.o. Date of PLANNED Hospice Admission: 3/6/22  Hospice Attending: Dr. Celestina Kang  Primary Care Physician: Sony Gutierrez     [facility] Address:  [address], [address]    Primary Contact and Phone:  Kirby Tyler [phone]    ADVANCE CARE PLANNING    Code Status: DNR  Durable DNR: [x]  Yes  []  No  Advance Care Planning 3/3/2022   Patient's Healthcare Decision Maker is: Legal Next of Kin   Confirm Advance Directive None   Patient Would Like to Complete Advance Directive Unable       Yazidi: NO PREFERENCE   Home: family will need assistance with planning as they were not prepared for pt's decline      HOSPICE SUMMARY     Verbal CTI of terminal diagnosis with life expectancy of 6 months or less received from:   Dr. Samia Rowe    For the Hospice Diagnosis of: Metastatic Breast Ca    NCD: Declined       CLINICAL INFORMATION   Allergies: Allergies   Allergen Reactions    Latex Rash     Localized redness and rash    Adhesive Rash and Itching         Currently this patient has:    Supplemental O2: 5L NC   Peripheral IV: R AC 24g, L AC 20g  Wounds: R breast ~ 5 x 8 x 0.1 cm skin lesion, cancerous. No active bleeding currently and no odor, yellow base 90%, 10% red, drainage small serosanguinous, breast tissue surrounding firm/large, no erythema. Malignancy. See wound care note from 3/3/22 for picture        COVID Screening: Negative 3/6/22      ASSESSMENT & PLAN     1. Symptom Issues Identified: dyspnea on 5L NC, R cancerous breast mass, restlessness, intermittent confusion/delirium     2. Spiritual Issues Identified: none    3. Psych/ Social/ Emotional Issues Identified: pt did not keep son informed of decline.  Son still coming to terms with prognosis as he thought pt had more time but likely has hours to short days now. CARE COORDINATION     1. Hospice Consents: signed via docusign by son, Ana Qureshi    2. DME Ordered/Company/Delivery Plan: N/A     3. Symptom Kit and other Medications Needs: N/A    4. Home Admission Reservation: N/A    5. Transportation by: [facility]   Scheduled for: 16:30     6. Verbal Report/Handoff given to: bedside RN to call report to [facility] at [phone]    7. Phone number to [address] [place] [address]: [phone]    1.  Supplies/Wound Care: R cancerous breast mass, will need ramsey

## 2022-03-06 NOTE — DISCHARGE SUMMARY
Brandan Eid [facility]  [facility], [address] [place] [address]  [phone]    Physician Discharge Summary     Patient ID:  Russell Powell  [id number]  79 y.o.  1955    Admit date: 3/2/2022    Discharge date and time: 3/6/2022 3:02 PM    Admission Diagnoses: Metastatic cancer [facility]) [C79.9]    Discharge Diagnoses:  Principal Diagnosis <principal problem not specified>                                            Active Problems:    Metastatic cancer ([address] (3/2/2022)           Hospital Course:     78 yo hx of metastatic breast CA, extensive bone metastasis, presented w/ AMS, hypercalcemia     Dyspnea: Currently on NC. Concern for new PNA, also prior mets noted. Stop IVF. DC hospice      Hypercalcemia: Improved. Ca was 14 on admission. S/p Zometa and calcitonin. Oncology evaluated. Dc to hospice       Acute met encephalopathy: improved. Due to hypercalcemia. Dc hospice      Metastatic breast CA w/ bone mets/acute cancer pain: poor prognosis. Likely has weeks to live. Patient now DNR and DC to hospice      Elevated LFTs/lactate: trending down. Due to liver mets. PCP: Tim Thompson PA-C     Consults: Hematology/Oncology and Palliative Care    Significant Diagnostic Studies:    Bone Scan   IMPRESSION  Extensive osseous metastatic disease as described above. Discharge Exam:  Physical Exam:    Gen: ill-appearing, some distress   HEENT:  Pink conjunctivae, PERRL, hearing intact to voice  Resp:  Some accessory muscle use, rhonchi b/l   Card:  No murmurs, normal S1, S2 without thrills, bruits or peripheral edema  Abd:  Soft, non-tender, non-distended, normoactive bowel sounds are present  Musc:  No cyanosis or clubbing  Skin:  Large breast wound (see wound care note)  Neuro:   Moves all ext  Psych:  Oriented x3     Disposition: home  Discharge Condition: Stable    Patient Instructions:   Current Discharge Medication List      START taking these medications    Details albuterol-ipratropium (DUO-NEB) 2.5 mg-0.5 mg/3 ml nebu 3 mL by Nebulization route every six (6) hours as needed for Wheezing. Qty: 30 Nebule, Refills: 0         CONTINUE these medications which have NOT CHANGED    Details   HYDROcodone-acetaminophen (NORCO) 5-325 mg per tablet Take 1 Tablet by mouth every six (6) hours as needed for Pain for up to 30 days. Max Daily Amount: 4 Tablets. Qty: 40 Tablet, Refills: 0    Associated Diagnoses: Metastatic breast cancer ([facility] Cancer associated pain      ondansetron hcl (ZOFRAN) 8 mg tablet Take 1 Tablet by mouth every eight (8) hours as needed for Nausea. Qty: 60 Tablet, Refills: 3           Activity: Activity as tolerated  Diet: Clear liquids, advance as tolerated  Wound Care: None needed    Follow-up with  Follow-up Information     Follow up With Specialties Details Why Contact Info    Tawny Verduzco PA-C Physician Assistant Schedule an appointment as soon as possible for a visit in 1 week Hospital Follow Up [facility]  [phone]            Follow-up tests/labs as above.      Signed:  Vedia Severe, DO  3/6/2022  3:02 PM  **I personally spent 35 min on discharge**

## 2022-03-06 NOTE — PROGRESS NOTES
3/6/2022  Case Management Note    2:40 PM  [facility] can be here at 4:30. Packet will be prepared. FREDI Murry    2:26 PM  Per hospice RN Seth, patient appropriate for discharge to [facility]. Contacted [facility] for soonest possible transport time. MD made aware. FREDI Murry    9:22 AM  Per MD patient's family now agreed to hospice information session. [facility] Patient is now DNR. Will continue to follow.      FREDI Murry

## 2022-03-06 NOTE — PROGRESS NOTES
Bedside and Verbal shift change report given to [address] (oncoming nurse) by Aurelio Hightower RN (offgoing nurse). Report included the following information SBAR, Kardex, MAR and Quality Measures.

## 2022-03-07 NOTE — PROGRESS NOTES
1842  Pt arrived at the [facility]. Pt is Mottling. Pt lethargic. Able to answer yes no questions. Pt trying to get out of bed. Rn redirected pt several times. Lungs diminished. Pt using her accessory muscles. O2 at 5lpm. Pt has a nonproductive cough. Pt with severe dyspnea. Unable to tolerate lying flat. Tobar placed. Pt is mottling - knees, feet and hands. Pt has an IV in her R AC. Dressing is clear and intact. 1855  Pt medicated with dilaudid and Lorazepam.     1700  Respirations shallow. Pt very lethargic.     1915  Report given

## 2022-03-07 NOTE — HOSPICE
1900:  Received report from [place], [place]:  Family arrived to [facility]. Son See Bell, his wife and 2 sons. Instructed in end of life signs pt was showing. Gave booklet, \" Gone From  My Sight. \" Discussed pt's unresponsiveness, mottling and previous agitation point to pt getting closer to end of life. Accepting, but shocked as pt had been talking to him via phone prior to d/c from [facility]. Discussed having on call  or counselor to see them. Declined at this time. 2000: Other family members arrived. Sister very tearful. 2030:  Vital signs reassessed. Pt remains unresponsive. Extremities remain mottled. Resp deep and rate 16.    2130:  Lips pale. Family members asking questions about pt's status. Answered all questions. 2211:  Scheduled lorazepam and Dilaudid given IV. Pt briefly opened eyes. Fixed gaze. Arms remain flaccid. 2300:  Resting quietly with eyes closed. Neutral facial expression. Resp even and unlabored. 0015: Turned pt. Opened eyes. Blank stare. Moaned loudly. Became very dyspneic Gave scheduled lorazepam and Dilaudid IV.            0115:  Resting quietly with eyes closed. Neutral facial expression. Resp even and unlabored. 0200:  Resting quietly with eyes closed. Neutral facial expression. Resp deep and unlabored. 0996:  Having some periods of apnea. Resting with eyes closed. Feet remain cold and mottled. Withdrew foot during assessment. Some muscle tone returned to arms. 0408:  Resp irregular. Resting with eyes closed. Neutral facial expression. 0500:  Resp irregular. Resting with eyes closed. Neutral facial expression. 0602:  Repositioned pt. Eyes opened and started having dyspnea while being moved. Scheduled lorazepam 1 mg and Dilaudid 2 mg given IV. Continues to have mottling of hands and feet. Only had 75 ml of urine output this shift. Updated son of pt's status. Appreciative of information. 0455:  Report given to oncoming nurse.

## 2022-03-07 NOTE — PROGRESS NOTES
Problem: Pressure Injury - Risk of  Goal: *Prevention of pressure injury  Description: Document Rajesh Scale and appropriate interventions in the flowsheet.   Outcome: Not Progressing Towards Goal  Note: Pressure Injury Interventions:  Sensory Interventions: Assess changes in LOC    Moisture Interventions: Absorbent underpads,Apply protective barrier, creams and emollients    Activity Interventions: Pressure redistribution bed/mattress(bed type)    Mobility Interventions: Float heels,HOB 30 degrees or less    Nutrition Interventions: Document food/fluid/supplement intake,Offer support with meals,snacks and hydration    Friction and Shear Interventions: Apply protective barrier, creams and emollients,HOB 30 degrees or less,Lift sheet,Minimize layers                Problem: Dyspnea Due to End of Life  Goal: Demonstrate understanding of and ability to manage respiratory symptoms at end of life  Outcome: Not Progressing Towards Goal     Problem: Pain  Goal: Assess satisfaction of level of comfort and symptom control  Outcome: Not Progressing Towards Goal

## 2022-03-07 NOTE — HOSPICE
This was an initial visit to assess needs and offer support. Pt was in bed and appeared comfortable but did not respond to this  or conversation in the room. Multiple people were present. The family noted that she is a deeply Roman Catholic person. She has been visited today by her  and another community . Thus her and their needs are being met. Advised of our continuing availability for supplement support as needed/desired.

## 2022-03-07 NOTE — PROGRESS NOTES
18: 42:New admission with a Dx of Breast CA, pt is lethargic  Responds to yes and no questions with confusion breathing with accessory muscles resp 26 o2 in use @ 5 L via nc, terminal restlessness noted not easily  redirected mottling noted to upper and  Lower extremities. Bladder distended assessment done by Ashley Guevara RN and myself Tobar put in 100 ml output of yellow urine. Family arrived updated by Don Benavides.

## 2022-03-07 NOTE — HOSPICE
[facility] Help to Those in Need  [phone]    Social Work Admission Note  Patient Name: Anitra Martinez  YOB: 1955  Age: 79 y.o. Date of Visit: 22  Facility of Care: [facility]  Patient Room:      Hospice Attending: Izola Dakin, MD  Hospice Diagnosis: Breast Cancer    Level of Care:    [x]  GIP    []  Respite   []  Routine    Consents/NCD Documentation:     Consents Reviewed:   []  Yes  [x]  No, completed by other hospice staff member. Person Reviewed/Signed with:  []  Patient   [x]  Pts NOK/MPOA  Name:     Right to NCD Reviewed:   []  Yes  [x]  No, completed by other hospice staff member. NCD Requested:   []  Yes  [x]  No    Admission Nurse/Intake Notified NCD was requested:  []  Yes  []  No  [x]  Not requested    Planned Start of Care Date: 3/6/22    Hospice Witness Representative: Azeem Red   LCSW met with pt at bedside. Pt's sister and brother in law also present. Pt received lying in hospital bed and appeared to be comfortably resting but was unresponsive to sound and touch. Per sister, pt had been more alert and sitting up yesterday. Family does feel that this decline has been rapid. RAJI plans to get some lunch for pt's sister and they will continue to sit lyels at bedside. Son was here earlier in the day, but left for a brief time. LCSW reached out to him via phone to introduce self and offer support. He was appreciative of call and plans to discuss  planning further with his wife and will let team know what they end up deciding.     ADVANCE CARE PLANNING    Advance Directive:  []  Yes  [x]  No   []  Would like to complete  Primary MPOA:   Secondary MPOA:   Umasilver Juan: Son, Samanta Raymond is LNOK  Code Status: DNR  Durable DNR: _ Yes  _ No  Advance Care Planning 3/3/2022   Patient's Healthcare Decision Maker is: Legal Next of Kin   Confirm Advance Directive None   Patient Would Like to Complete Advance Directive Unable       Relationship Status:  []  Single     []        []      []  Domestic Partner     []  /  []  Common Law  []    [x]  Unknown    If in a relationship, name of partner/spouse:  Duration of relationship:    Yarsani/Spirituality: NO PREFERENCE  [x]  Active In Yarsani/Spirituality   []  Not Active   []  N/A  Notes:     Home: TBD, son discussing with his wife and will let staff know when they decide  Resources Provided:  []  [facility]  []  Information on applying for disability  []  FMLA Paperwork  []  Letters Requested by [facility]   []  Jaymie [address]  []  Final Arrangements Resources   []  Outside counseling services (individual or group therapy)  []  Grief resources   []  Bhavesh Anmol  []  [facility]   []  [facility]   []  Gone From My Sight   []  Referral Sent to [employer]   []  Referral Sent to Music Therapy   []  Referral Sent to Pet Therapy  []  Other  Social Work Initial Assessment     Sex:  female    Pronoun Preference:   []  He/Him/His   [x]  She/Her/Hers   []  They/Them/Theirs   []   Patient Name   []   Decline to Answer  []   Unknown  []   Other   Notes:     Race/Ethnicity: (elizabeth all that apply)  []  [place] [facility] or [place] Native  []    []  Black or [place] American  []   or   []  [address] [place] [address] or [facility]  [x]  White  []  Unknown  []  Other     Inpatient Financial Agreement Completed:   [x]  Yes  []  No    Insurance:   [x]  Medicare   []  Medicaid     []  [employer]    []  Self-Pay/Uninsured   Notes:      Has pt applied for FAP? []  Needs to Apply  []  Application Completed and Submitted   []  Approved/ Expiration Date:   [x]  N/A  Notes:     Has pt applied for Medicaid? []  Needs to Apply  []  Application Completed and Submitted/Application Number:   [x]  N/A  Notes:     Has a long term care screening ([facility]) been requested?   []  Requested   []  Not Requested, Needs follow up  []  Completed  []  N/A  Notes: Does the pt have a long term care insurance policy? []  Yes  [x]  No  []  Yes, Needing assistance with paperwork  Notes:      Service:    []  Yes   []  No       [x]  Unknown    Appropriate for Pinning Ceremony:   []  Yes      [x]  No    Is patient using VA benefits? []  Yes      [x]  No  []  Needs assistance with accessing benefits  Notes:       Work History:   [x]  Full-Time/Part-Time  []  Retired   []  Other  Notes:     Primary Language: English  []   Needed  []   utilized during visit  []   Waived/ form completed    Ability to express thoughts/needs/feelings  []  Expressed thoughts/feelings/needs without difficulty  []  Requires extra time and cuing  []  Speech limited single words  []  Uses only gestures (eye, blinking eye or head movement/pointing)  []  Unable to express thoughts/feelings/needs (speech unintelligible or inappropriate)  [x]  Unresponsive  Notes:      Mental Status:  []  Alert-oriented to:     []  Person     []  Place     []  Time  []  Comatose-responds to:    []   Verbal stimuli    []  Tactile stimuli    []  Painful stimuli  []  Forgetful  []  Disoriented/Confused  []  Lethargic  []  Agitated  [x]  Unresponsive  []  Other (specify):    Notes:      Patients description of Illness/Current Health Status:    [x]  Patient unable to discuss  []  Patient unwilling to discuss  []  (Specify)        Knowledge/Understanding of Disease Process  Patient:    []  Demonstrates knowledge/understanding of disease process   []  Demonstrates knowledge/understanding of treatment plan   []  Demonstrates knowledge/understanding of prognosis   []  Demonstrates acceptance of prognosis   []  Demonstrates knowledge/understanding of resuscitation status   []  Other (specify)  Caregiver:   [x]  Demonstrates knowledge/understanding of disease process   [x]  Demonstrates knowledge/understanding of treatment plan   [x]  Demonstrates knowledge/understanding of prognosis   [x] Demonstrates acceptance of prognosis   []  Demonstrates knowledge/understanding of resuscitation status   []  Other (specify)  Notes:      Patients living arrangement/care setting:  Use the PRIOR COLUMN when the PATIENTS current health status necessitated a change in his/her primary residence. Prior Current Response              []             []    Patients own home/residence              []             []    Home of family member/friend              []             []    Boarding home/Group Home              []             []    Assisted living facility/assisted center              []             []    Hospital/Acute care facility              []             []    Skilled nursing facility              []             []    Long term care facility/Nursing home              []             [x]    Hospice in Patient      Primary Caregiver:  []  No Primary Caregiver  Name of Primary Caregiver: Joann Henry  Primary Caregiver Phone Number:   Relationship or Primary Caregiver:    []  Spouse/Significant other       []  Child        []  Step child       []  Sibling   []  Parent   []  Friend/Neighbor   []  Community/Jainism Volunteer   []  Paid help   []  Other (specify):___________  Notes:       Family members/Significant others:  Name:  Relationship:  Phone Number:  Actively involved in care? []  Yes  []  No    Name:  Relationship:  Phone Number:  Actively involved in care?   []  Yes  []  No    Social support systems: (select ONE best description)  [x]  Excellent social support system which includes three or more family members or friends  []  Good social support system which includes two or less members or friends  []  [address] support which includes one family member or friend  []  Poor social support; no family members or friends; basically ALONE  Notes:      Emotional Status: (elizabeth all that apply)    Patient Caregiver Response                 []                [x]    Mood/Affect stable and appropriate []                []    Angry                 []                []    Anxious                 []                []    Apprehensive                 []                []    Avoidant                 []                []    Clinging                 []                []    Depressed                 []                []    Distraught                 []                []    Fearful                 []                []    Flat Affect                 []                []    Helpless                 []                []    Hostile                 []                []    Impulsive                 []                []    Irritable                 []                []    Labile                 []                []    Manic                 []                []    Restlessness                 []                []    Sad                 []                []    Strain/Stress                 []                []    Suspicious                 []                []     Tearful                 []                 []     Withdrawn          Notes:     Coping Skills (strengths/weakness):    Patient: Coping Skills (strength/weakness):    Family/caregiver (strength/weakness):     Douglas of care upon discharge (elizabeth all that apply):     [x]  No burden evident   []  Family must administer medications   []  Illness causing financial strain   []  Family/Support feels overwhelmed   []  Family/Support sleep disturbed with patients care   []  Patients care causes extra physical stress  of death  []  Illness causes changes in family lifestyle  []  Illness impacting family/support employment  []  Family experiencing increased time demands  []  Patients behavior endangers family  []  Denial of patients illness  []  Concern over outcome of illness/fear  []  Patients behavior embarrassing to family   Notes:      Risk Factors: (elizabeth all that apply):    [x]  No burden evident   []  Alcohol abuse  []  Financial resources inadequate to meet basic needs (food/house/etc)  []  Financial resources inadequate to meet health care needs (supplies/equipment/medications)  []  Food/nutrition resources inadequate  []  Home environment unsafe/inadequate for home care  []  Homicidal risk  []  Lives alone or without concerned relatives  []  Multiple medications/complex schedule  []  Physical limitations increase likelihood of falls  []  Plan of care/treatments complicated  []  Substance use/abuse  []  Suicidal risk  []  Visual impairment threatens safety/ability to perform self-care  []  Other (specify):     Abuse/Neglect (actual/potential risks):  [x]  No signs of abuse/neglect  []  History of abuse/neglect                 []  Physical          []  Sexual  []  History of domestic violence  []  Lacks adequate physical care  []  Lacks emotional nurturing/support  []  Lacks appropriate stimulation/cognitive experiences  []  Left alone inappropriately  []  Lacks necessary supervision  []  Inadequate or delayed medical care  []  Unsafe environment (i.e guns/drug use/history of violence in the home/etc.)  []  Bruising or other physical signs of injury present  []  Refer to child/adult protective services  []  Other (specify):   Notes:      Current Sources of Stress (in Addition to Current Illness):   [x]  None reported  []  Bills/Debt    []  Career/Job change    []   (short term)    []   (long term)    []  Death of a child (recent)    []  Death of a parent (recent)   []  Death of a spouse (recent)   []  Employment status changed   []  Family discord    []  Financial loss/Inadequate inther (specify):come  []  Job loss  []  Legal issues unresolved  []  Lifestyle change  []  Marital discord  []  Marriage within the last year  []  Paperwork (insurance/legal/etc) overwhelming  []  Separation/Divorce  []  Other (specify):  Notes:       Interventions/Plan of Care   [x]  MSW will assess social and emotional factors related to coping with end of life issues  []  MSW will provide community resource planning/referral   [x]  MSW to assist with relocation to different care setting if/when symptoms stabilize  [x]  Pt/Pts family will receive emotional support. []  Pt/Pts family will share the details surrounding the pts disease progression  []  Pt/Pts Family will show expression of grief by participating in life review. []  Pt/Pts Family will be educated and able to verbalize understanding of mental, emotional, and physical symptoms of grief. []  Pt/Pts Family will be educated and able to identify skills and social support to help cope with grief. []  Pts family will receive support for grief with emphasis on developmentally appropriate language.   [] Other:   Notes:       Discharge Planning   []  Home with family member    []  Return back to nursing home/facility  [x]  Needs assistance with placement/paid caregivers  []  Short Stay under routine level of care at [facility]   []  Other  Notes:     MSW Assessment Completed by: Jamari Sanches  03/07/22    Time In: 1130       Time Out: 1200

## 2022-03-07 NOTE — PROGRESS NOTES
Problem: Pressure Injury - Risk of  Goal: *Prevention of pressure injury  Description: Document Rajesh Scale and appropriate interventions in the flowsheet. 3/6/2022 2241 by Fay Mccormack  Outcome: Progressing Towards Goal  Note: Pressure Injury Interventions:  Sensory Interventions: Assess changes in LOC,Float heels,Keep linens dry and wrinkle-free    Moisture Interventions: Absorbent underpads,Apply protective barrier, creams and emollients,Internal/External urinary devices    Activity Interventions: Pressure redistribution bed/mattress(bed type)    Mobility Interventions: Float heels,Pressure redistribution bed/mattress (bed type)    Nutrition Interventions:  (NPO)    Friction and Shear Interventions: Apply protective barrier, creams and emollients,Lift sheet             3/6/2022 2237 by Fay Mccormack  Note: Pressure Injury Interventions:  Sensory Interventions: Assess changes in LOC,Float heels,Keep linens dry and wrinkle-free    Moisture Interventions: Absorbent underpads,Apply protective barrier, creams and emollients,Internal/External urinary devices    Activity Interventions: Pressure redistribution bed/mattress(bed type)    Mobility Interventions: Float heels,Pressure redistribution bed/mattress (bed type)    Nutrition Interventions:  (NPO)    Friction and Shear Interventions: Apply protective barrier, creams and emollients,Lift sheet                Problem: Falls - Risk of  Goal: *Absence of Falls  Description: Document Renetta Fall Risk and appropriate interventions in the flowsheet.   3/6/2022 2241 by Fay Mccormack  Outcome: Progressing Towards Goal  Note: Fall Risk Interventions:  Mobility Interventions: Bed/chair exit alarm    Mentation Interventions: Bed/chair exit alarm,Door open when patient unattended,Family/sitter at bedside    Medication Interventions: Bed/chair exit alarm    Elimination Interventions: Bed/chair exit alarm,Call light in reach           3/6/2022 2237 by Donal Mcdowell Libby Rodriguez  Note: Fall Risk Interventions:  Mobility Interventions: Bed/chair exit alarm    Mentation Interventions: Bed/chair exit alarm,Door open when patient unattended,Family/sitter at bedside    Medication Interventions: Bed/chair exit alarm    Elimination Interventions: Bed/chair exit alarm,Call light in reach              Problem: Infection - Risk of, Urinary Catheter-Associated Urinary Tract Infection  Goal: *Absence of infection signs and symptoms  Outcome: Progressing Towards Goal     Problem: Dyspnea Due to End of Life  Goal: Demonstrate understanding of and ability to manage respiratory symptoms at end of life  Outcome: Progressing Towards Goal     Problem: Imminent Death  Goal: Collaborate with patient/family/caregiver/interdisciplinary team to minimize and manage end of life symptoms  Outcome: Progressing Towards Goal     Problem: Hospice Orientation  Goal: Demonstrate understanding of hospice philosophy, plan of care, and home hospice program  Description: The patient/family/caregiver will demonstrate understanding of hospice philosophy, plan of care and the home hospice program as evidenced by participation in meeting the patient's psychosocial, spiritual, medical, and physical needs inclusive of medical supplies/equipment focusing on symptoms. Outcome: Progressing Towards Goal     Problem: Pain  Goal: Assess satisfaction of level of comfort and symptom control  Outcome: Progressing Towards Goal  Goal: *Control of acute pain  Outcome: Progressing Towards Goal     Problem: Anxiety/Agitation  Goal: Verbalize or staff assess the ability to manage anxiety  Description: The patient/family/caregiver will verbalize and demonstrate ability to manage the patient's anxiety throughout hospice care.   Outcome: Progressing Towards Goal     Problem: Breathing Pattern - Ineffective  Goal: *Use of effective breathing techniques  Outcome: Progressing Towards Goal     Problem: General Wound Care  Goal: *Non-infected wound: Improvement of existing wound, absence of infection, and maintenance of skin integrity  Outcome: Progressing Towards Goal  Goal: Interventions  Note: Clean breast wound with  normal saline. Apply moist 4x4 Cover with ABD or foam dressing. Change as needed when soiled  for comfort. Problem: General Wound Care  Goal: Interventions  Note: Clean breast wound with  normal saline. Apply moist 4x4 Cover with ABD or foam dressing. Change as needed when soiled  for comfort. Problem: General Wound Care  Goal: Interventions  Note: Clean breast wound with  normal saline. Apply moist 4x4 Cover with ABD or foam dressing. Change as needed when soiled  for comfort.

## 2022-03-07 NOTE — PROGRESS NOTES
..  0700:Report received from [employer] using [employer] I/O and [facility] Pt is unresponsive to verbal and tactile shallow resp noted o2 in use HOB elevated comfort and safety maintained. 0800:Pt continues to be unresponsive grimace and moan when repositioned, mouth care done. 0900:Family at bedside updated on care, mouth care done. 97:93:FV Janeth rounds new order to reduce dilaudid and ativan SEE MAR.  10:35:PRN dilaudid adm for increase resp at 32, family educated. 1100: Pt breathing is still labored HOB elevated continue to monitor. 12:22:Adm schedule dilaudid and ativan for pain and restlessness, pt is also having periods of apnea 20 sec with face flushed. sister at bedide educated on changes. 13:13:Pt continues to be labored, Adm PRN dilaudid and atvan  1400:Resting more comfortable, pt family and  at bedside. 15:31:Adm schedule ativan and dilaudid for pain and restlessness. 1600:Pt had a full bed bath, tolerated poorly. 16:29:Adm PRN Toradol and ativan for pain and restlessness. 1700:Pt is resting more comfortable surrounding by family. 18:43:Adm schedule meds, tolerated well, family supported at bedside. 1900:Report given Gómez Dykes.             NAME OF PATIENT:      LEVEL OF CARE: [facility]  REASON FOR GIP: N/A     *PATIENT REMAINS ELIGIBLE FOR GIP LEVEL OF CARE AS EVIDENCED BY:Pain anxiety Resp distress    REASON FOR RESPITE:n/a       O2 SAFETY:  Concentrator positioning (6\" from furniture/drapes), Tanks stored in garza , No petroleum based products on face while oxygen in use and Oxygen sign on the door     FALL INTERVENTIONS PROVIDED:   Implemented/recommended use of fall risk identification flag to all team members, Implemented/recommended assistive devices and encouraged their use, Implemented/recommended resources for alarm system (personal alarm, bed alarm, call bell, etc.)  and Implemented/recommended environmental changes (remove hazards, lower bed, improve lighting, etc.)     INTERDISPLINARY COMMUNICATION/COLLABORATION:  Physician, MSW, [place] and RN, CNA     NEW MEDICATION INITIATION DOCUMENTATION:N/A      Reason medication is being initiated:  N/A     MD / Provider name consulted re: change in status / initiation of new medication:  N/A     New Symptom(s):  N/A     New Order(s):  N/A     Name of the person notified of the changes:  N/A     Name of person being taught:  N/A     Instructions given:  N/A     Side Effects taught:  N/A     Response to teaching:  N/A        COMFORTABLE DYING MEASURE:  Is Patient/family satisfied with symptom level?  yes     DISCHARGE PLAN:Pt will remain at [facility] until she passes.

## 2022-03-07 NOTE — HOSPICE
[facility]  [phone]    Inpatient Nursing Admission   Patient Name: Yoli Castorena  YOB: 1955  Age: 79 y.o. Date of Hospice Admission: 3/6/2022  Hospice Attending Elected by Patient: Tono Sheth MD  Primary Care Physician: Miller Barrios PA-C  Admitting RN: Norman Morrison RN  :  Abner Ramos     Level of Care (GIP/Routine/Respite):  GIP  Facility of Care: [facility]  Patient Room: 16/01     HOSPICE SUMMARY   ER Visits/ Hospitalizations in past year: 1  Hospice Diagnosis: Breast Cancer  Onset Date of Hospice Diagnosis:  2018  Summary of Disease Progression Leading to Hospice Diagnosis:  per Dr Braden Rudolph care note 03/03:  Yoli Castorena is a 79 y.o. with a past history of right breast cancer (8/2018, declined surgery/therapy) with recent finding of extensive metastatic disease in bilateral lung, liver, and bone (w/ pathologic fracture of the posterior right 3rd rib and large lytic lesions in L5, T1 and T2), history of remote history of tobacco use, who was admitted on 3/2/2022 from the [facility] center with a diagnosis of hypercalcemia due to malignancy and elevated liver enzymes. Current medical issues leading to Palliative Medicine involvement include:  Cancer related symptoms and support for decision making.      In August 2018, patient 1st diagnosed with localized breast cancer, had been following by surgeon Dr. Franklin Melo, surgery recommended, however patient ultimately declined. In June 2019 Dr. Franklin Melo referred patient to oncologist Dr. Gela Avendano in 2019, after repeat imaging showed interval progression of disease, treatment options discussed, however patient continued to decline. Patient returned to Dr. Gela Avendano on 2/16/2022, bone scan significant for extensive osseous metastatic disease, in calvarium, cervical, thoracic, lumbar spine, ribs bilaterally, femur bilaterally and throughout the pelvis.   Patient continued to decline chemo, was willing to start HER 2 directed therapy. Per Dr. Schuyler Singh  Discharge note 03/06/22:  Hospital Course:  71 yo hx of metastatic breast CA, extensive bone metastasis, presented w/ AMS, hypercalcemia     Dyspnea: Currently on NC.  Concern for new PNA, also prior mets noted. Stop IVF. DC hospice      Hypercalcemia: Improved.  Ca was 14 on admission.  S/p Zometa and calcitonin. Oncology evaluated. Dc to hospice       Acute met encephalopathy: improved.  Due to hypercalcemia. Dc hospice      Metastatic breast CA w/ bone mets/acute cancer pain: poor prognosis. Angie Henao has weeks to live.  Patient now DNR and DC to hospice      Elevated LFTs/lactate: trending down.  Due to liver mets.    Diagnoses RELATED to the terminal prognosis:  Breast CA with bone and liver mets. Other Diagnoses:   Patient Active Problem List   Diagnosis Code    Breast cancer, right ([facility] [address] C50.911    Metastatic breast cancer ([facility] [address] C50.919    Metastatic cancer ([facility] [address] C79.9       Rationale for a prognosis of life expectancy of 6 months or less if the disease follows its normal course (Disease Specific History):     Reny Rome is a 79 y.o. who was admitted to [facility]. The patient's principle diagnosis of  Breast CA has resulted in unresponsiveness. pain, dyspnea, agitation. Functionally, the patient's Palliative Performance Scale has declined over a period of  5 days and is estimated at 10 (PPS score). Objective information that support this patients limited prognosis includes:Chest x-ray 3/2/2022 and CT 2/18/2022.     FINDINGS: A portable AP radiograph of the chest was obtained at 09:33 hours. The  lungs demonstrate increased basilar airspace opacities with numerous pulmonary  masses redemonstrated. No pleural effusion or pneumothorax.  The cardiac and  mediastinal contours and pulmonary vascularity are normal.  The chest wall  structures and visualized upper abdomen show no significant change in left rib  fracture deformity and no acute findings with incidental note of degenerative  spine and shoulder changes as well as diffuse osteopenia. IMPRESSION  Increased airspace opacities in both bases concerning for pneumonic  in the portal clinical setting. Redemonstration of multiple pulmonary  Metastases. Bone scan 02/22/22: FINDINGS: There are foci of increased tracer activity throughout the calvarium,  cervical, thoracic, lumbar spine, ribs bilaterally, femur bilaterally, and  throughout the pelvis. Increased tracer activity in the left ankle may be  degenerative in nature. Incidental renal imaging shows no abnormality.    IMPRESSION  Extensive osseous metastatic disease as described above. The patient/family chose comfort measures with the support of Hospice. Patient meets for GP LOC as evidenced by    IV therapy, titration of medication, frequent nurse assessment. Prognosis estimated based on 03/06/22 clinical assessment is:   [] Few to Many Hours  [x] Hours to Days   [] Few to Many Days   [] Days to Weeks   [] Few to Many Weeks   [] Weeks to Months   [] Few to Many Months    ASSESSMENT    Patient self-reports:  []  Yes    [x] No    SYMPTOMS:  Dyspnea, pain, agitation? SIGNS/PHYSICAL FINDINGS:  Upon initial arrival to [facility] pt was restless, trying to get out of bed and pulling off clothes. Became very dyspneic and then became unresponsive, with mottling of feet and hands. Has diminished and coarse breath sounds. Audible wheezing heard when [facility] lowered and pt became very dyspneic when turned. Moaned loudly and grimaced when turned. Extremities flaccid. Remains minimally responsive only responding to pain. It was reported that pt was requiring a 1:1 sitter at the hospital for over 24 hours r/t pt being agitated and having hallucinations.        FAST for all dementia:      Learning Assessment:  Patient  Is patient willing/able to learn? no  What is the highest level of education completed? unknown  Learning preference (written material, demonstration, visual)? None   Learning barriers (ESOL, [place], poor vision)? Disease process    Caregiver  Is caregiver willing to learn care for patient? yes  What is the highest level of education completed? unnown  Learning preference (written material, demonstration, visual)? none  Learning barriers (ESOL, [place], poor vision)? no    CLINICAL INFORMATION     Wt Readings from Last 3 Encounters:   03/06/22 61 kg (134 lb 6.4 oz)   03/02/22 61 kg (134 lb 6.4 oz)   03/02/22 61.2 kg (135 lb)     Ht Readings from Last 3 Encounters:   03/06/22 5' 6\" (1.676 m)   03/02/22 5' 6\" (1.676 m)   03/02/22 5' 6\" (1.676 m)     Body mass index is 21.69 kg/m². Visit Vitals  /60 (BP 1 Location: Right upper arm, BP Patient Position: At rest)   Pulse 85   Temp 98.2 °F (36.8 °C)   Resp 16   Ht 5' 6\" (1.676 m)   Wt 61 kg (134 lb 6.4 oz)   SpO2 93%   BMI 21.69 kg/m²       LAB VALUES  No results found for this visit on 03/06/22 (from the past 12 hour(s)). No results found for this visit on 03/06/22 (from the past 6 hour(s)). Lab Results   Component Value Date/Time    Protein, total 5.3 (L) 03/06/2022 02:28 AM    Albumin 1.9 (L) 03/06/2022 02:28 AM       Currently this patient has:  [x] Supplemental O2 [x] Peripheral IV  [] PICC    [] PORT   [x] Tobar Catheter [] NG Tube   [] PEG Tube [] Ostomy    [] AICD: Has ICD been deactivated? [] Yes [] No:______    PLAN     1: Admit to [facility] level of care for  Symptom management of dyspnea, agitation and pain   2. For dyspnea : Give Dilaudid 2 mg IV every 3 hours scheduled and every 15 minutes as needed  3. For agitation:  Given lorazepam 1 mg IV every 3 hours scheduled and  every 15 minutes as needed  4. For pain: Give Dilaudid 2 mg  IV every 3 hours scheduled and every 15 minutes as needed. 5. PRN medications in place for breakthrough symptom management  6.  Infection control and prevention as needed Tobar cath care per facility protocol for infection prevention   7. Provide wound care to right breast tumor as needed to promote comfort. 8. Provide support/education to caregiver/family  Junious May  9. Monitor closely for changes in symptoms  10. Provide support and frequent rounds for patient comfort and safety   11. Maintain skin integrity as tolerated for hospice patient, turning and repositioning for comfort  12. Provide  and  for patient and family support  15. Continue to discuss discharge plan for any changes    Hospice Team Frequency Orders:  Skilled Nurse - Daily x 14 days with 5 PRN visits for symptom control. MSW  1 x weekly with 5 visits PRN family support and need for volunteer services.   1 x weekly with 5 visits PRN spiritual support. CNA  daily x 14 days    ADVANCE CARE PLANNING (Complete in ACP Flow Sheet)   Code Status: DNR  Durable DNR: [x]  Yes  []  No  Code Status Discussed/Confirmed: yes  Preference for Other Life Sustaining Treatment Discussed/Confirmed: yes  Hospitalization Preference:    Advance Care Planning 3/3/2022   Patient's Healthcare Decision Maker is: Legal Next of Kin   Confirm Advance Directive None   Patient Would Like to Complete Advance Directive Unable        Service: [] Yes  [x]  No      [] Unknown  Appropriate for Pinning Ceremony:  [] Yes     [x] No  [place]: NO PREFERENCE   Home:  TBD    DISCHARGE PLANNING     1. Discharge Plan: Probable EOl at [facility]. If symptoms managed may need placement   2. Patient/Family teaching: Discussed end of life signs, treatment plan, medications  3.  Response to patient/family teaching: accepting    SOCIAL/EMOTIONAL/SPIRITUAL NEEDS     Spiritual Issues Identified: end of life support    Psych/ Social/ Emotional Issues Identified:  End of life support and  home selection    Caregiver Support:  [] Provided information on End of Life Care   [x] Material Provided: [facility] [employer] [facility]ight or Alyse Lagos contacted, discharge to hospice order received  Dr. Kaylah Tovar contacted, agrees to serve as attending provider for hospice and provided verbal certification of terminal illness with life expectancy of 6 months or less. Orders for hospice admission, medications and plan of treatment received. Medication reconciliation completed. MEDS: See medication list below  DME: Per [facility]  Supplies: Per [facility]  IDT communication to include MD, SN, SW, CH and support team    ALLERGIES AND MEDICATIONS     Allergies:    Allergies   Allergen Reactions    Latex Rash     Localized redness and rash    Adhesive Rash and Itching     Current Facility-Administered Medications   Medication Dose Route Frequency    bisacodyL (DULCOLAX) suppository 10 mg  10 mg Rectal DAILY PRN    haloperidol lactate (HALDOL) injection 2 mg  2 mg IntraVENous Q4H PRN    HYDROmorphone (DILAUDID) injection 2 mg  2 mg IntraVENous Q3H    HYDROmorphone (DILAUDID) injection 2 mg  2 mg IntraVENous Q15MIN PRN    LORazepam (ATIVAN) injection 1 mg  1 mg IntraVENous Q3H    LORazepam (ATIVAN) injection 2 mg  2 mg IntraVENous Q15MIN PRN    ziprasidone (GEODON) 10 mg in sterile water (preservative free) 0.5 mL injection  10 mg IntraMUSCular Q12H PRN    glycopyrrolate (ROBINUL) injection 0.2 mg  0.2 mg IntraVENous Q4H PRN    acetaminophen (TYLENOL) suppository 650 mg  650 mg Rectal Q6H PRN    ketorolac (TORADOL) injection 30 mg  30 mg IntraVENous Q6H PRN    albuterol-ipratropium (DUO-NEB) 2.5 MG-0.5 MG/3 ML  3 mL Nebulization Q6H PRN
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
## 2022-03-08 NOTE — PROGRESS NOTES
Problem: Pressure Injury - Risk of  Goal: *Prevention of pressure injury  Description: Document Rjaesh Scale and appropriate interventions in the flowsheet. 3/8/2022 0805 by Raji Hayes  Outcome: Resolved/Met  3/7/2022 2128 by Raji Hayes  Outcome: Progressing Towards Goal  Note: Pressure Injury Interventions:  Sensory Interventions: Assess changes in LOC    Moisture Interventions: Absorbent underpads    Activity Interventions: Pressure redistribution bed/mattress(bed type)    Mobility Interventions: Float heels    Nutrition Interventions:  (NPO)    Friction and Shear Interventions: Apply protective barrier, creams and emollients                Problem: Patient Education: Go to Patient Education Activity  Goal: Patient/Family Education  3/8/2022 0805 by Raji Hayes  Outcome: Resolved/Met  3/7/2022 2128 by Helen VIDAL  Outcome: Progressing Towards Goal     Problem: Falls - Risk of  Goal: *Absence of Falls  Description: Document Sushila Skill Fall Risk and appropriate interventions in the flowsheet.   3/8/2022 0805 by Raji Hayes  Outcome: Resolved/Met  3/7/2022 2128 by Raji Hayes  Outcome: Progressing Towards Goal  Note: Fall Risk Interventions:  Mobility Interventions: Bed/chair exit alarm    Mentation Interventions: Bed/chair exit alarm    Medication Interventions: Bed/chair exit alarm    Elimination Interventions: Bed/chair exit alarm    History of Falls Interventions: Bed/chair exit alarm         Problem: Patient Education: Go to Patient Education Activity  Goal: Patient/Family Education  3/8/2022 0805 by Helen VIDAL  Outcome: Resolved/Met  3/7/2022 2128 by Helen VIDAL  Outcome: Progressing Towards Goal     Problem: Infection - Risk of, Urinary Catheter-Associated Urinary Tract Infection  Goal: *Absence of infection signs and symptoms  3/8/2022 0805 by Helen VIDAL  Outcome: Resolved/Met  3/7/2022 2128 by Helen VIDAL  Outcome: Progressing Towards Goal     Problem: Dyspnea Due to End of Life  Goal: Demonstrate understanding of and ability to manage respiratory symptoms at end of life  3/8/2022 0805 by Kapil Winkler  Outcome: Resolved/Met  3/7/2022 2128 by Quentin VIDAL  Outcome: Progressing Towards Goal     Problem: Imminent Death  Goal: Collaborate with patient/family/caregiver/interdisciplinary team to minimize and manage end of life symptoms  3/8/2022 0805 by Kapil Winkler  Outcome: Resolved/Met  3/7/2022 2128 by Quentin VIDAL  Outcome: Progressing Towards Goal     Problem: Hospice Orientation  Goal: Demonstrate understanding of hospice philosophy, plan of care, and home hospice program  Description: The patient/family/caregiver will demonstrate understanding of hospice philosophy, plan of care and the home hospice program as evidenced by participation in meeting the patient's psychosocial, spiritual, medical, and physical needs inclusive of medical supplies/equipment focusing on symptoms. 3/8/2022 0805 by Kapil Winkler  Outcome: Resolved/Met  3/7/2022 2128 by Kapil Winkler  Outcome: Progressing Towards Goal     Problem: Pain  Goal: Assess satisfaction of level of comfort and symptom control  3/8/2022 0805 by Kapil Winkler  Outcome: Resolved/Met  3/7/2022 2128 by Kapil Winkler  Outcome: Progressing Towards Goal  Goal: *Control of acute pain  3/8/2022 0805 by Kapil Winkler  Outcome: Resolved/Met  3/7/2022 2128 by Quentin VIDAL  Outcome: Progressing Towards Goal     Problem: Anxiety/Agitation  Goal: Verbalize or staff assess the ability to manage anxiety  Description: The patient/family/caregiver will verbalize and demonstrate ability to manage the patient's anxiety throughout hospice care.   3/8/2022 0805 by Kapil Winkler  Outcome: Resolved/Met  3/7/2022 2128 by Quentin VIDAL  Outcome: Progressing Towards Goal     Problem: Breathing Pattern - Ineffective  Goal: *Use of effective breathing techniques  3/8/2022 0805 by Kapil Winkler  Outcome: Resolved/Met  3/7/2022 2128 by Quentin Lewis L  Outcome: Progressing Towards Goal     Problem: General Wound Care  Goal: *Non-infected wound: Improvement of existing wound, absence of infection, and maintenance of skin integrity  3/8/2022 0805 by Rosy Maher  Outcome: Resolved/Met  3/7/2022 2128 by Rosy Maher  Outcome: Progressing Towards Goal  Goal: Interventions  3/8/2022 0805 by Rosy Maher  Outcome: Resolved/Met  3/7/2022 2128 by Rosy Maher  Outcome: Progressing Towards Goal     Problem: Emotional Support Needs  Goal: Patient/family is receiving emotional support  3/8/2022 0805 by Rosy Maher  Outcome: Resolved/Met  3/7/2022 2128 by Rosy Maher  Outcome: Progressing Towards Goal

## 2022-03-08 NOTE — PROGRESS NOTES
Problem: Pressure Injury - Risk of  Goal: *Prevention of pressure injury  Description: Document Rajesh Scale and appropriate interventions in the flowsheet. Outcome: Progressing Towards Goal  Note: Pressure Injury Interventions:  Sensory Interventions: Assess changes in LOC    Moisture Interventions: Absorbent underpads    Activity Interventions: Pressure redistribution bed/mattress(bed type)    Mobility Interventions: Float heels    Nutrition Interventions:  (NPO)    Friction and Shear Interventions: Apply protective barrier, creams and emollients                Problem: Patient Education: Go to Patient Education Activity  Goal: Patient/Family Education  Outcome: Progressing Towards Goal     Problem: Falls - Risk of  Goal: *Absence of Falls  Description: Document Renetta Fall Risk and appropriate interventions in the flowsheet.   Outcome: Progressing Towards Goal  Note: Fall Risk Interventions:  Mobility Interventions: Bed/chair exit alarm    Mentation Interventions: Bed/chair exit alarm    Medication Interventions: Bed/chair exit alarm    Elimination Interventions: Bed/chair exit alarm    History of Falls Interventions: Bed/chair exit alarm         Problem: Patient Education: Go to Patient Education Activity  Goal: Patient/Family Education  Outcome: Progressing Towards Goal     Problem: Infection - Risk of, Urinary Catheter-Associated Urinary Tract Infection  Goal: *Absence of infection signs and symptoms  Outcome: Progressing Towards Goal     Problem: Dyspnea Due to End of Life  Goal: Demonstrate understanding of and ability to manage respiratory symptoms at end of life  Outcome: Progressing Towards Goal     Problem: Imminent Death  Goal: Collaborate with patient/family/caregiver/interdisciplinary team to minimize and manage end of life symptoms  Outcome: Progressing Towards Goal     Problem: Hospice Orientation  Goal: Demonstrate understanding of hospice philosophy, plan of care, and home hospice program  Description: The patient/family/caregiver will demonstrate understanding of hospice philosophy, plan of care and the home hospice program as evidenced by participation in meeting the patient's psychosocial, spiritual, medical, and physical needs inclusive of medical supplies/equipment focusing on symptoms. Outcome: Progressing Towards Goal     Problem: Pain  Goal: Assess satisfaction of level of comfort and symptom control  Outcome: Progressing Towards Goal  Goal: *Control of acute pain  Outcome: Progressing Towards Goal     Problem: Anxiety/Agitation  Goal: Verbalize or staff assess the ability to manage anxiety  Description: The patient/family/caregiver will verbalize and demonstrate ability to manage the patient's anxiety throughout hospice care.   Outcome: Progressing Towards Goal     Problem: Breathing Pattern - Ineffective  Goal: *Use of effective breathing techniques  Outcome: Progressing Towards Goal     Problem: General Wound Care  Goal: *Non-infected wound: Improvement of existing wound, absence of infection, and maintenance of skin integrity  Outcome: Progressing Towards Goal  Goal: Interventions  Outcome: Progressing Towards Goal     Problem: Emotional Support Needs  Goal: Patient/family is receiving emotional support  Outcome: Progressing Towards Goal

## 2022-03-08 NOTE — HOSPICE
1900 Received report from [facility] LPN. 1277 Patient is sleeping with relaxed face with unlabored respirations and family at bedside. Please see flow sheet for assessment.  Patient is grimacing and moaning gave prn dose of Dilaudid 1 mg and Ativan 0.5 mg.   Patient is sleeping with relaxed face.  Gave scheduled dose of Dilaudid 1 mg, and 0.5 mg. Patient is sleeping with shallow respirations. 223 Patient is sleeping with a relaxed face. 2340 Patient coughed, grimaced, and moaned. Gave prn dose of Dilaudid 1 mg, and 0.5 mg of ATivan. 6478 Gave scheduled dose of Dilaudid 1 mg and 0.5 mg. Patient is sleeping with relaxed face and shallow respirations. 0140 Patient is sleeping with shallow respirations and a relaxed face. 0230 Patient is sleeping with periods of apnea and a relaxed face. 4033 Patient is coughing and moaning gave scheduled dose of Ativan 0.5 mg and Dilaudid 1 mg.  0330 Patient is grimacing and moaning during repositioning gave prn dose of Dilaudid 1 mg, ativan 0.5 mg and Robinul for secretions. 0187 Patient is coughing and grimacing gave prn dose of Dilaudid 1 mg and 0.5 mg of ATivan. 3538 Patient is unresponsive with mottling in shoulders. 5966 Patient has shallow respirations with periods of apnea. 0600 Patient  with sister at bedside. NAME OF PATIENT:  Frieda Montiel    LEVEL OF CARE:  GIP    REASON FOR GIP:   Pain, despite numerous changes in medications    *PATIENT REMAINS ELIGIBLE FOR GIP LEVEL OF CARE AS EVIDENCED BY: (MUST BE ADDRESSED OF PATIENT GIP) Patient is receiving IV medications and needs frequent nursing assessments.       REASON FOR RESPITE:  NA    O2 SAFETY:  Tanks stored in garza , No petroleum based products on face while oxygen in use and Oxygen sign on the door    FALL INTERVENTIONS PROVIDED:   Implemented/recommended use of fall risk identification flag to all team members, Implemented/recommended assistive devices and encouraged their use, Implemented/recommended resources for alarm system (personal alarm, bed alarm, call bell, etc.) , Implemented/recommended environmental changes (remove hazards, lower bed, improve lighting, etc.) and Implemented/recommended increased supervision/assistance    INTERDISPLINARY COMMUNICATION/COLLABORATION:  Physician and RN, CNA    NEW MEDICATION INITIATION DOCUMENTATION:  NA    Reason medication is being initiated:  NA    MD / Provider name consulted re: change in status / initiation of new medication:  NA    New Symptom(s):  NA    New Order(s):  NA    Name of the person notified of the changes:  NA    Name of person being taught:  NA    Instructions given:  NA    Side Effects taught:  NA    Response to teaching:  NA      COMFORTABLE DYING MEASURE:  Is Patient/family satisfied with symptom level?  yes    DISCHARGE PLAN:  End of life

## 2022-03-09 NOTE — HOSPICE
[address] Bereavement/Condolence Call: This MSW called pts son, Arron Bella to offer condolences and support. MSW left  offered [address] information for ongoing support.        Say     [phone]

## 2022-03-23 ENCOUNTER — APPOINTMENT (OUTPATIENT)
Dept: INFUSION THERAPY | Age: 67
End: 2022-03-23

## 2022-04-06 LAB
CA-I BLD-SCNC: 1.18 MMOL/L (ref 1.12–1.32)
CA-I BLD-SCNC: 1.18 MMOL/L (ref 1.12–1.32)

## 2022-04-13 ENCOUNTER — APPOINTMENT (OUTPATIENT)
Dept: INFUSION THERAPY | Age: 67
End: 2022-04-13

## 2022-05-04 ENCOUNTER — APPOINTMENT (OUTPATIENT)
Dept: INFUSION THERAPY | Age: 67
End: 2022-05-04
